# Patient Record
Sex: FEMALE | Race: WHITE | NOT HISPANIC OR LATINO | Employment: OTHER
[De-identification: names, ages, dates, MRNs, and addresses within clinical notes are randomized per-mention and may not be internally consistent; named-entity substitution may affect disease eponyms.]

---

## 2017-01-03 DIAGNOSIS — I10 HYPERTENSION GOAL BP (BLOOD PRESSURE) < 140/90: Primary | ICD-10-CM

## 2017-01-03 RX ORDER — ATENOLOL 25 MG/1
TABLET ORAL
Qty: 180 TABLET | Refills: 3 | Status: SHIPPED | OUTPATIENT
Start: 2017-01-03 | End: 2019-06-17

## 2017-01-03 NOTE — TELEPHONE ENCOUNTER
atenolol (TENORMIN) 25 MG tablet      Last Written Prescription Date: 11/4/15  Last Fill Quantity: 90, # refills: 3    Last Office Visit with [facility], [facility] or [facility] prescribing provider:  12/29/16   Future Office Visit:        BP Readings from Last 3 Encounters:   12/29/16 122/70   12/23/16 120/78   05/16/16 144/88

## 2017-01-03 NOTE — TELEPHONE ENCOUNTER
Prescription approved per [facility] Refill Protocol.    Ruthann Mcconnell RN

## 2017-05-26 ENCOUNTER — HEALTH MAINTENANCE LETTER (OUTPATIENT)
Age: 42
End: 2017-05-26

## 2017-09-15 ENCOUNTER — MYC MEDICAL ADVICE (OUTPATIENT)
Dept: FAMILY MEDICINE | Facility: CLINIC | Age: 42
End: 2017-09-15

## 2018-06-01 ENCOUNTER — HEALTH MAINTENANCE LETTER (OUTPATIENT)
Age: 43
End: 2018-06-01

## 2018-07-10 ENCOUNTER — TELEPHONE (OUTPATIENT)
Dept: FAMILY MEDICINE | Facility: CLINIC | Age: 43
End: 2018-07-10

## 2018-07-10 DIAGNOSIS — F41.1 GAD (GENERALIZED ANXIETY DISORDER): ICD-10-CM

## 2018-07-10 DIAGNOSIS — F33.1 MODERATE RECURRENT MAJOR DEPRESSION (H): ICD-10-CM

## 2018-07-10 NOTE — LETTER
[address] [address]  Phone: [phone]  Fax: [phone]        July 13, 2018      Ivy A Jose                                                                                                                                [id number]  [facility] [address]            Dear Ms. Garcia,    We are concerned about your health care.  We recently provided you with a medication refill.  Many medications require routine follow-up with your Doctor.      At this time we ask that: You schedule a routine office visit with your physician to follow your medication recheck.     Your prescription: Has been refilled for 1 month so you may have time for the above noted follow-up.      Thank you,      Dr. Elizabeth ROLDAN. Care Team

## 2018-07-10 NOTE — TELEPHONE ENCOUNTER
"Requested Prescriptions   Pending Prescriptions Disp Refills     escitalopram (LEXAPRO) 20 MG tablet [Pharmacy Med Name: ESCITALOPRAM OXALATE 20MG TABS] 90 tablet 3     Sig: TAKE ONE TABLET BY MOUTH ONCE DAILY    SSRIs Protocol Failed    7/10/2018  7:00 AM       Failed - PHQ-9 score less than 5 in past 6 months    Please review last PHQ-9 score.          Failed - Recent (6 mo) or future (30 days) visit within the authorizing provider's specialty    Patient had office visit in the last 6 months or has a visit in the next 30 days with authorizing provider or within the authorizing provider's specialty.  See \"Patient Info\" tab in inbasket, or \"Choose Columns\" in Meds & Orders section of the refill encounter.           Passed - Patient is age 18 or older       Passed - No active pregnancy on record       Passed - No positive pregnancy test in last 12 months        busPIRone (BUSPAR) 10 MG tablet [Pharmacy Med Name: BUSPIRONE HCL 10MG TABS] 120 tablet 11     Sig: TAKE TWO TABLETS BY MOUTH TWICE A DAY    Atypical Antidepressants Protocol Failed    7/10/2018  7:00 AM       Failed - Patient has PHQ-9 score less than 5 in past 6 months.    Please review last PHQ-9 score.          Failed - Recent (6 mo) or future (30 days) visit within the authorizing provider's specialty    Patient had office visit in the last 6 months or has a visit in the next 30 days with authorizing provider or within the authorizing provider's specialty.  See \"Patient Info\" tab in inbasket, or \"Choose Columns\" in Meds & Orders section of the refill encounter.           Passed - Patient is age 18 or older       Passed - No active pregnancy on record       Passed - No positive pregnancy test in past 12 mos          Last Written Prescription Date:  12/29/16  Last Fill Quantity: 9*0,  # refills: 3   Last Office Visit with [facility] or [facility] prescribing provider:  12/16/16   Future Office Visit:       Buspirone 10 MG       Last Written Prescription Date:  " 12/29/16  Last Fill Quantity: 120,   # refills: 11  Last Office Visit: 12/16/16  Future Office visit:

## 2018-07-11 NOTE — TELEPHONE ENCOUNTER
Lexapro:  PHQ-9 SCORE 10/7/2015 11/4/2015 12/29/2016   Total Score - - -   Total Score 3 0 13     Routing refill request to provider for review/approval because:  Labs out of range:  PHQ-9  Labs not current:  PHQ-9  T'd up 1 month for provider review.    Will forward to schedulers to schedule patient for OV.  Brittany Wilson RN  ;

## 2018-07-12 RX ORDER — BUSPIRONE HYDROCHLORIDE 10 MG/1
20 TABLET ORAL 2 TIMES DAILY
Qty: 120 TABLET | Refills: 0 | Status: SHIPPED | OUTPATIENT
Start: 2018-07-12 | End: 2019-06-17

## 2018-07-12 RX ORDER — ESCITALOPRAM OXALATE 20 MG/1
20 TABLET ORAL DAILY
Qty: 30 TABLET | Refills: 0 | Status: SHIPPED | OUTPATIENT
Start: 2018-07-12 | End: 2019-06-17

## 2018-07-12 NOTE — TELEPHONE ENCOUNTER
Patient needs follow-up office visit and med recheck.    Electronically signed by:  Drew Johnson M.D.  7/12/2018

## 2018-07-13 NOTE — TELEPHONE ENCOUNTER
Left message for patient to call back and speak with any .  Thank you,  Gris Stearns  Patient Representative    2nd attempt, routing to team to send letter

## 2018-07-13 NOTE — TELEPHONE ENCOUNTER
Left message for patient to call back in regards to message below. Please schedule for OV.   Tiffany Monet MA

## 2019-06-11 ENCOUNTER — TELEPHONE (OUTPATIENT)
Dept: FAMILY MEDICINE | Facility: CLINIC | Age: 44
End: 2019-06-11

## 2019-06-11 NOTE — TELEPHONE ENCOUNTER
See my chart message/triage RN    Message     Appointment For: Ivy Garcia ([id number])   Visit Type: MYCHART OFFICE VISIT LONG (119)      6/17/2019    2:45 PM  30 mins.  Héctor Madrigal MD [facility]      Patient Comments:   Swollen ankles and feet.

## 2019-06-11 NOTE — TELEPHONE ENCOUNTER
RN sent My Chart message about swelling and Home Care Instructions for it. If she is feeling SOB and weight up >5# with no change in dietary or activity habits, then should be seen sooner than later.....................VINNIE Lopez

## 2019-06-17 ENCOUNTER — OFFICE VISIT (OUTPATIENT)
Dept: FAMILY MEDICINE | Facility: CLINIC | Age: 44
End: 2019-06-17
Payer: COMMERCIAL

## 2019-06-17 ENCOUNTER — TELEPHONE (OUTPATIENT)
Dept: FAMILY MEDICINE | Facility: CLINIC | Age: 44
End: 2019-06-17

## 2019-06-17 VITALS
TEMPERATURE: 98.3 F | OXYGEN SATURATION: 100 % | HEIGHT: 63 IN | DIASTOLIC BLOOD PRESSURE: 80 MMHG | WEIGHT: 136 LBS | RESPIRATION RATE: 16 BRPM | BODY MASS INDEX: 24.1 KG/M2 | HEART RATE: 100 BPM | SYSTOLIC BLOOD PRESSURE: 148 MMHG

## 2019-06-17 DIAGNOSIS — Z98.84 S/P GASTRIC BYPASS: ICD-10-CM

## 2019-06-17 DIAGNOSIS — R60.0 BILATERAL LEG EDEMA: Primary | ICD-10-CM

## 2019-06-17 DIAGNOSIS — R01.1 UNDIAGNOSED CARDIAC MURMURS: ICD-10-CM

## 2019-06-17 DIAGNOSIS — F10.21 RECOVERING ALCOHOLIC IN REMISSION (H): ICD-10-CM

## 2019-06-17 DIAGNOSIS — I10 HYPERTENSION GOAL BP (BLOOD PRESSURE) < 140/90: ICD-10-CM

## 2019-06-17 LAB
ALBUMIN SERPL-MCNC: 3.9 G/DL (ref 3.4–5)
ALP SERPL-CCNC: 49 U/L (ref 40–150)
ALT SERPL W P-5'-P-CCNC: 18 U/L (ref 0–50)
ANION GAP SERPL CALCULATED.3IONS-SCNC: 8 MMOL/L (ref 3–14)
AST SERPL W P-5'-P-CCNC: 22 U/L (ref 0–45)
BILIRUB SERPL-MCNC: 0.3 MG/DL (ref 0.2–1.3)
BUN SERPL-MCNC: 11 MG/DL (ref 7–30)
CALCIUM SERPL-MCNC: 8.8 MG/DL (ref 8.5–10.1)
CHLORIDE SERPL-SCNC: 106 MMOL/L (ref 94–109)
CO2 SERPL-SCNC: 25 MMOL/L (ref 20–32)
CREAT SERPL-MCNC: 0.59 MG/DL (ref 0.52–1.04)
ERYTHROCYTE [DISTWIDTH] IN BLOOD BY AUTOMATED COUNT: 22.2 % (ref 10–15)
FERRITIN SERPL-MCNC: 2 NG/ML (ref 8–252)
FOLATE SERPL-MCNC: 15.7 NG/ML
GFR SERPL CREATININE-BSD FRML MDRD: >90 ML/MIN/{1.73_M2}
GLUCOSE SERPL-MCNC: 89 MG/DL (ref 70–99)
HCT VFR BLD AUTO: 20.8 % (ref 35–47)
HGB BLD-MCNC: 5.1 G/DL (ref 11.7–15.7)
MCH RBC QN AUTO: 15.9 PG (ref 26.5–33)
MCHC RBC AUTO-ENTMCNC: 24.5 G/DL (ref 31.5–36.5)
MCV RBC AUTO: 65 FL (ref 78–100)
PLATELET # BLD AUTO: 544 10E9/L (ref 150–450)
POTASSIUM SERPL-SCNC: 4 MMOL/L (ref 3.4–5.3)
PROT SERPL-MCNC: 7.9 G/DL (ref 6.8–8.8)
PTH-INTACT SERPL-MCNC: 56 PG/ML (ref 18–80)
RBC # BLD AUTO: 3.21 10E12/L (ref 3.8–5.2)
SODIUM SERPL-SCNC: 139 MMOL/L (ref 133–144)
TSH SERPL DL<=0.005 MIU/L-ACNC: 1.54 MU/L (ref 0.4–4)
VIT B12 SERPL-MCNC: 255 PG/ML (ref 193–986)
WBC # BLD AUTO: 6 10E9/L (ref 4–11)

## 2019-06-17 PROCEDURE — 36415 COLL VENOUS BLD VENIPUNCTURE: CPT | Performed by: FAMILY MEDICINE

## 2019-06-17 PROCEDURE — 82306 VITAMIN D 25 HYDROXY: CPT | Performed by: FAMILY MEDICINE

## 2019-06-17 PROCEDURE — 80053 COMPREHEN METABOLIC PANEL: CPT | Performed by: FAMILY MEDICINE

## 2019-06-17 PROCEDURE — 82607 VITAMIN B-12: CPT | Performed by: FAMILY MEDICINE

## 2019-06-17 PROCEDURE — 84443 ASSAY THYROID STIM HORMONE: CPT | Performed by: FAMILY MEDICINE

## 2019-06-17 PROCEDURE — 84425 ASSAY OF VITAMIN B-1: CPT | Mod: 90 | Performed by: FAMILY MEDICINE

## 2019-06-17 PROCEDURE — 82746 ASSAY OF FOLIC ACID SERUM: CPT | Performed by: FAMILY MEDICINE

## 2019-06-17 PROCEDURE — 83970 ASSAY OF PARATHORMONE: CPT | Performed by: FAMILY MEDICINE

## 2019-06-17 PROCEDURE — 99214 OFFICE O/P EST MOD 30 MIN: CPT | Performed by: FAMILY MEDICINE

## 2019-06-17 PROCEDURE — 93000 ELECTROCARDIOGRAM COMPLETE: CPT | Performed by: FAMILY MEDICINE

## 2019-06-17 PROCEDURE — 85027 COMPLETE CBC AUTOMATED: CPT | Performed by: FAMILY MEDICINE

## 2019-06-17 PROCEDURE — 99000 SPECIMEN HANDLING OFFICE-LAB: CPT | Performed by: FAMILY MEDICINE

## 2019-06-17 PROCEDURE — 82728 ASSAY OF FERRITIN: CPT | Performed by: FAMILY MEDICINE

## 2019-06-17 RX ORDER — LISINOPRIL 10 MG/1
10 TABLET ORAL DAILY
Qty: 30 TABLET | Refills: 0 | Status: SHIPPED | OUTPATIENT
Start: 2019-06-17 | End: 2019-07-01

## 2019-06-17 ASSESSMENT — MIFFLIN-ST. JEOR: SCORE: 1218.08

## 2019-06-17 ASSESSMENT — PATIENT HEALTH QUESTIONNAIRE - PHQ9: SUM OF ALL RESPONSES TO PHQ QUESTIONS 1-9: 7

## 2019-06-17 NOTE — TELEPHONE ENCOUNTER
Per  Dr. Madrigal;  called patient to let her know that her Hemoglobin was 5.1. Let her know that we will call her when we get the rest of her labs. She is to go to the ED if she starts to feel lightheaded, or dizzy. She voiced understanding.   Afsaneh Corrales CMA ([facility])

## 2019-06-17 NOTE — PROGRESS NOTES
"Subjective     Ivy Teague is a 44 year old female who presents to clinic today for the following health issues:    HPI   Musculoskeletal problem/pain      Duration: 3 Months    Description  Location: Bilateral ankle and feet    Intensity:  moderate    Accompanying signs and symptoms: swelling and if you push on it it's tender    History  Previous similar problem: no   Previous evaluation:  none    Precipitating or alleviating factors:  Trauma or overuse: no   Aggravating factors include: standing    Therapies tried and outcome: rest/inactivity, ice and elevation            Here for mild bilateral lower extremity edema for past 3 months.  Nothing severe, but mildly bothersome.  Legs are uncomfortable when they are swollen but not super painful.  She also notes some dyspnea on exertion and can only walk up about 2 flights of stairs without having to stop and catch her breath.  She is able to do some work as a  at her apartment building without too much exercise issue.    Has history of hypertension.  Has been off of medications for over a year due to lack of insurance.  Has it now so is back getting healthcare.  Was on atenolol.  This was due to pregnancy induced hypertension and ease in management while she was still having kids.  Has not been on lisinopril before.    Has past history of alcohol dependency.  Has been in remission for 1.5 years.    History of gastric bypass.  Has had nutritional deficiencies in the past.    Reviewed and updated as needed this visit by Provider  Tobacco  Allergies  Meds  Problems  Med Hx  Surg Hx  Fam Hx         Review of Systems   ROS COMP: Constitutional, HEENT, cardiovascular, pulmonary, GI, , musculoskeletal, neuro, skin, endocrine and psych systems are negative, except as otherwise noted.      Objective    /80   Pulse 100   Temp 98.3  F (36.8  C) (Temporal)   Resp 16   Ht 1.588 m (5' 2.5\")   Wt 61.7 kg (136 lb)   SpO2 100%   BMI 24.48 kg/m  "   Body mass index is 24.48 kg/m .  Physical Exam   Constitutional: She appears well-developed and well-nourished. No distress.   Cardiovascular: Normal rate and regular rhythm. Exam reveals no friction rub.   Murmur heard.   Systolic murmur is present with a grade of 3/6.  Pulmonary/Chest: Effort normal and breath sounds normal. No stridor. She has no decreased breath sounds. She has no wheezes. She has no rhonchi. She has no rales.   Abdominal: Soft. Normal appearance and bowel sounds are normal. She exhibits no distension and no mass. There is no hepatosplenomegaly. There is no tenderness. No hernia.   Musculoskeletal: She exhibits edema (1+ bilateral lower extremity).   Neurological: She is alert.   Psychiatric: She has a normal mood and affect. Her speech is normal and behavior is normal. Judgment and thought content normal. Cognition and memory are normal.                  EKG shows normal sinus rhythm without ST elevation or T-wave abnormality.      Assessment & Plan     ASSESSMENT/ORDERS:    ICD-10-CM    1. Bilateral leg edema R60.0 Comprehensive metabolic panel     Basic metabolic panel     Echocardiogram Complete   2. Undiagnosed cardiac murmurs R01.1 Echocardiogram Complete   3. Hypertension goal BP (blood pressure) < 140/90 I10 Comprehensive metabolic panel     Basic metabolic panel     Echocardiogram Complete     lisinopril (PRINIVIL/ZESTRIL) 10 MG tablet   4. Recovering alcoholic in remission (H) F10.21    5. S/P gastric bypass Z98.84 CBC with platelets     Ferritin     Vitamin B12     Vitamin D Deficiency     Folate     Parathyroid Hormone Intact     Vitamin B1 whole blood     Lipid panel reflex to direct LDL Fasting     Comprehensive metabolic panel     Basic metabolic panel     EKG 12-lead complete w/read - Clinics     TSH with free T4 reflex     PLAN:  1.   Lab evaluation as noted above.  2.  Echocardiogram  3.  Needs to restart blood pressure medication.  Will start on lisinopril today.  Needs  follow-up BMP and fasting lipids.  4.  Follow-up in 2 weeks to recheck her blood pressure, labs and to discuss echocardiogram results.     Tobacco Cessation:   reports that she has been smoking cigarettes.  She has been smoking about 1.00 pack per day. She has never used smokeless tobacco.  Tobacco Cessation Action Plan: Information offered: Patient not interested at this time            Return in about 2 weeks (around 7/1/2019) for follow up on lab and echocardiogram results.    Héctor Madrigal MD  [facility]

## 2019-06-18 LAB — DEPRECATED CALCIDIOL+CALCIFEROL SERPL-MC: 22 UG/L (ref 20–75)

## 2019-06-19 ENCOUNTER — HOSPITAL ENCOUNTER (OUTPATIENT)
Dept: CARDIOLOGY | Facility: CLINIC | Age: 44
Discharge: HOME | End: 2019-06-19
Attending: FAMILY MEDICINE | Admitting: FAMILY MEDICINE
Payer: COMMERCIAL

## 2019-06-19 DIAGNOSIS — I10 HYPERTENSION GOAL BP (BLOOD PRESSURE) < 140/90: ICD-10-CM

## 2019-06-19 DIAGNOSIS — R60.0 BILATERAL LEG EDEMA: ICD-10-CM

## 2019-06-19 DIAGNOSIS — R01.1 UNDIAGNOSED CARDIAC MURMURS: ICD-10-CM

## 2019-06-19 PROCEDURE — 93306 TTE W/DOPPLER COMPLETE: CPT | Mod: 26 | Performed by: INTERNAL MEDICINE

## 2019-06-19 PROCEDURE — 93306 TTE W/DOPPLER COMPLETE: CPT

## 2019-06-21 ENCOUNTER — TELEPHONE (OUTPATIENT)
Dept: FAMILY MEDICINE | Facility: CLINIC | Age: 44
End: 2019-06-21

## 2019-06-21 DIAGNOSIS — R60.0 BILATERAL LEG EDEMA: Primary | ICD-10-CM

## 2019-06-21 DIAGNOSIS — I10 HYPERTENSION GOAL BP (BLOOD PRESSURE) < 140/90: ICD-10-CM

## 2019-06-21 DIAGNOSIS — R01.1 UNDIAGNOSED CARDIAC MURMURS: ICD-10-CM

## 2019-06-21 LAB — VIT B1 BLD-MCNC: 118 NMOL/L (ref 70–180)

## 2019-06-21 NOTE — RESULT ENCOUNTER NOTE
Results called to patient.  I recommended she cardiology to discuss her echocardiogram findings in the setting of her lower extremity edema and hypertension.  No sure if this is at all related and/or if they would recommend anything specific for this.  Sending message to staff to place referral and contact patient to set up appointment.    Sincerely,  Dr. Madrigal

## 2019-06-21 NOTE — RESULT ENCOUNTER NOTE
Results called to patient and she was recommended to start three times daily over the counter iron supplement.  Recheck hemoglobin/iron in 2 months.  She is asymptomatic despite this low hemoglobin.    Héctor Madrigal MD

## 2019-06-24 NOTE — TELEPHONE ENCOUNTER
----- Message from Héctor Madrigal MD sent at 6/21/2019  4:04 PM CDT -----  Results called to patient.  I recommended she cardiology to discuss her echocardiogram findings in the setting of her lower extremity edema and hypertension.  No sure if this is at all related and/or if they would recommend anything specific for this.  Sending message to staff to place referral and contact patient to set up appointment.    Sincerely,  Dr. Madrigal  
Message left of recommendations per Dr. Madrigal that you follow up with Cardiology in regards to your echocardiogram results, ongoing lower extrem edema and your hypertension. Inform she is scheduled with Cardiology on Tuesday, 7/23 at 11:30 am. Number left to call if this appointment does not work for her to reschedule. Shahnaz Clay LPN    
Order placed for Cardiology consult for follow up on Echocardiogram results, her hypertension, and her bilateral lower extremity edema. Shahnaz Clay LPN    
22

## 2019-07-01 ENCOUNTER — OFFICE VISIT (OUTPATIENT)
Dept: FAMILY MEDICINE | Facility: CLINIC | Age: 44
End: 2019-07-01
Payer: COMMERCIAL

## 2019-07-01 VITALS
TEMPERATURE: 98.6 F | RESPIRATION RATE: 18 BRPM | OXYGEN SATURATION: 100 % | BODY MASS INDEX: 23.74 KG/M2 | WEIGHT: 134 LBS | SYSTOLIC BLOOD PRESSURE: 122 MMHG | HEART RATE: 122 BPM | DIASTOLIC BLOOD PRESSURE: 76 MMHG | HEIGHT: 63 IN

## 2019-07-01 DIAGNOSIS — R73.01 ELEVATED FASTING BLOOD SUGAR: ICD-10-CM

## 2019-07-01 DIAGNOSIS — I10 HYPERTENSION GOAL BP (BLOOD PRESSURE) < 140/90: Primary | ICD-10-CM

## 2019-07-01 DIAGNOSIS — R60.0 BILATERAL LEG EDEMA: ICD-10-CM

## 2019-07-01 DIAGNOSIS — I10 HYPERTENSION GOAL BP (BLOOD PRESSURE) < 140/90: ICD-10-CM

## 2019-07-01 DIAGNOSIS — Z98.84 S/P GASTRIC BYPASS: ICD-10-CM

## 2019-07-01 DIAGNOSIS — R93.1 ABNORMAL ECHOCARDIOGRAM: ICD-10-CM

## 2019-07-01 LAB
ANION GAP SERPL CALCULATED.3IONS-SCNC: 9 MMOL/L (ref 3–14)
BUN SERPL-MCNC: 10 MG/DL (ref 7–30)
CALCIUM SERPL-MCNC: 8.8 MG/DL (ref 8.5–10.1)
CHLORIDE SERPL-SCNC: 107 MMOL/L (ref 94–109)
CHOLEST SERPL-MCNC: 166 MG/DL
CO2 SERPL-SCNC: 23 MMOL/L (ref 20–32)
CREAT SERPL-MCNC: 0.54 MG/DL (ref 0.52–1.04)
GFR SERPL CREATININE-BSD FRML MDRD: >90 ML/MIN/{1.73_M2}
GLUCOSE SERPL-MCNC: 113 MG/DL (ref 70–99)
HDLC SERPL-MCNC: 66 MG/DL
LDLC SERPL CALC-MCNC: 83 MG/DL
NONHDLC SERPL-MCNC: 100 MG/DL
POTASSIUM SERPL-SCNC: 3.7 MMOL/L (ref 3.4–5.3)
SODIUM SERPL-SCNC: 139 MMOL/L (ref 133–144)
TRIGL SERPL-MCNC: 84 MG/DL

## 2019-07-01 PROCEDURE — 99214 OFFICE O/P EST MOD 30 MIN: CPT | Performed by: FAMILY MEDICINE

## 2019-07-01 PROCEDURE — 80048 BASIC METABOLIC PNL TOTAL CA: CPT | Performed by: FAMILY MEDICINE

## 2019-07-01 PROCEDURE — 36415 COLL VENOUS BLD VENIPUNCTURE: CPT | Performed by: FAMILY MEDICINE

## 2019-07-01 PROCEDURE — 80061 LIPID PANEL: CPT | Performed by: FAMILY MEDICINE

## 2019-07-01 RX ORDER — LISINOPRIL 10 MG/1
10 TABLET ORAL DAILY
Qty: 90 TABLET | Refills: 0 | Status: SHIPPED | OUTPATIENT
Start: 2019-07-01 | End: 2019-07-23

## 2019-07-01 ASSESSMENT — MIFFLIN-ST. JEOR: SCORE: 1209.01

## 2019-07-01 ASSESSMENT — PAIN SCALES - GENERAL: PAINLEVEL: NO PAIN (0)

## 2019-07-01 NOTE — PROGRESS NOTES
"Subjective     Ivy Teague is a 44 year old female who presents to clinic today for the following health issues:    HPI   Pt here to f/u on labs.         Edema better since starting lisinopril.  Hypertension has improved.  Labs reviewed.  Elevated glucose, otherwise normal labs.    Echocardiogram results reviewed.  Had murmur at last visit.  Dilated left and right atriums.  Normal EF.  Has history of alcohol use.  She is currently sober.  I recommended she see cardiologist when we called her with her results due to the echo findings and the bilateral lower extremity edema.    She feels better.  Reviewed and updated as needed this visit by Provider  Tobacco  Allergies  Meds  Problems  Med Hx  Surg Hx  Fam Hx         Review of Systems   ROS COMP: Constitutional, HEENT, cardiovascular, pulmonary, GI, , musculoskeletal, neuro, skin, endocrine and psych systems are negative, except as otherwise noted.      Objective    /76   Pulse 122   Temp 98.6  F (37  C) (Temporal)   Resp 18   Ht 1.588 m (5' 2.5\")   Wt 60.8 kg (134 lb)   SpO2 100%   Breastfeeding? No   BMI 24.12 kg/m    Body mass index is 24.12 kg/m .  Physical Exam   Constitutional: She appears well-developed and well-nourished. No distress.   Cardiovascular: Normal rate and regular rhythm. Exam reveals no friction rub.   No murmur heard.  Pulmonary/Chest: Effort normal and breath sounds normal. No stridor. She has no decreased breath sounds. She has no wheezes. She has no rhonchi. She has no rales.   Abdominal: Soft. Normal appearance and bowel sounds are normal. She exhibits no distension and no mass. There is no hepatosplenomegaly. There is no tenderness. No hernia.   Musculoskeletal: She exhibits edema (trace bilateral lower extremity.  no sock or pant indent lines.).   Neurological: She is alert.   Psychiatric: She has a normal mood and affect. Her speech is normal and behavior is normal. Judgment and thought content normal. " Cognition and memory are normal.                  Assessment & Plan     ASSESSMENT/ORDERS:    ICD-10-CM    1. Hypertension goal BP (blood pressure) < 140/90 I10 lisinopril (PRINIVIL/ZESTRIL) 10 MG tablet   2. Elevated fasting blood sugar R73.01    3. Abnormal echocardiogram R93.1    4. Bilateral leg edema R60.0      PLAN:  1.  Murmur resolved.  Edema better.  I recommended patient still see cardiology to review echocardiogram results and offer any additional monitoring recommendations.  2.  Continue lisinopril.  3.  Monitor fasting blood sugar.  Discussed long term risk for diabetes.      Tobacco Cessation:   reports that she has been smoking cigarettes.  She has been smoking about 1.00 pack per day. She has never used smokeless tobacco.  Tobacco Cessation Action Plan: Information offered: Patient not interested at this time            Return in about 3 months (around 10/1/2019) for next preventative visit (Physical).    Héctor Madrigal MD  [facility]

## 2019-07-03 NOTE — RESULT ENCOUNTER NOTE
Ivy,  Your results show your blood sugar is a little elevated.  The rest of your results are within normal limits.  Please let me know if you have any questions.    Sincerely,  Dr. Madrigal

## 2019-07-23 ENCOUNTER — TELEPHONE (OUTPATIENT)
Dept: FAMILY MEDICINE | Facility: CLINIC | Age: 44
End: 2019-07-23

## 2019-07-23 ENCOUNTER — OFFICE VISIT (OUTPATIENT)
Dept: CARDIOLOGY | Facility: CLINIC | Age: 44
End: 2019-07-23
Payer: COMMERCIAL

## 2019-07-23 VITALS
WEIGHT: 131.9 LBS | HEART RATE: 90 BPM | OXYGEN SATURATION: 97 % | BODY MASS INDEX: 23.37 KG/M2 | DIASTOLIC BLOOD PRESSURE: 86 MMHG | SYSTOLIC BLOOD PRESSURE: 146 MMHG | HEIGHT: 63 IN

## 2019-07-23 DIAGNOSIS — I10 HYPERTENSION GOAL BP (BLOOD PRESSURE) < 140/90: ICD-10-CM

## 2019-07-23 DIAGNOSIS — D50.9 IRON DEFICIENCY ANEMIA: Primary | ICD-10-CM

## 2019-07-23 DIAGNOSIS — D50.9 IRON DEFICIENCY ANEMIA, UNSPECIFIED IRON DEFICIENCY ANEMIA TYPE: Primary | ICD-10-CM

## 2019-07-23 DIAGNOSIS — D50.9 ANEMIA, IRON DEFICIENCY: Primary | ICD-10-CM

## 2019-07-23 LAB
ABO + RH BLD: NORMAL
ABO + RH BLD: NORMAL
BLD GP AB SCN SERPL QL: NORMAL
BLD PROD TYP BPU: NORMAL
BLOOD BANK CMNT PATIENT-IMP: NORMAL
FERRITIN SERPL-MCNC: 2 NG/ML (ref 8–252)
HGB BLD-MCNC: 4.8 G/DL (ref 11.7–15.7)
NUM BPU REQUESTED: 4
SPECIMEN EXP DATE BLD: NORMAL

## 2019-07-23 PROCEDURE — 99204 OFFICE O/P NEW MOD 45 MIN: CPT | Performed by: INTERNAL MEDICINE

## 2019-07-23 PROCEDURE — 36415 COLL VENOUS BLD VENIPUNCTURE: CPT | Performed by: INTERNAL MEDICINE

## 2019-07-23 PROCEDURE — 85018 HEMOGLOBIN: CPT | Performed by: INTERNAL MEDICINE

## 2019-07-23 PROCEDURE — 82728 ASSAY OF FERRITIN: CPT | Performed by: INTERNAL MEDICINE

## 2019-07-23 RX ORDER — LISINOPRIL 10 MG/1
20 TABLET ORAL DAILY
Qty: 90 TABLET | Refills: 0 | Status: SHIPPED | OUTPATIENT
Start: 2019-07-23 | End: 2019-09-27

## 2019-07-23 RX ORDER — HEPARIN SODIUM,PORCINE 10 UNIT/ML
5 VIAL (ML) INTRAVENOUS
Status: CANCELLED | OUTPATIENT
Start: 2019-07-23

## 2019-07-23 RX ORDER — HEPARIN SODIUM (PORCINE) LOCK FLUSH IV SOLN 100 UNIT/ML 100 UNIT/ML
5 SOLUTION INTRAVENOUS
Status: CANCELLED | OUTPATIENT
Start: 2019-07-23

## 2019-07-23 ASSESSMENT — MIFFLIN-ST. JEOR: SCORE: 1199.48

## 2019-07-23 NOTE — LETTER
7/23/2019    Drew Johnson MD, MD  [address] [place] [address]    RE: Ivy Teague       Dear Colleague,    I had the pleasure of seeing Ivy Teague in the [facility].    CARDIOLOGY CONSULT    REASON FOR CONSULT:  Shortness of breath, lower extremity edema      PRIMARY CARE PHYSICIAN:  Drew Johnson MD    HISTORY OF PRESENT ILLNESS: Ms. Gonzalez is a pleasant 44-year-old with past medical history significant for hypertension, gastric bypass surgery in 2002 and iron deficiency anemia who presents for the evaluation of shortness of breath and lower extremity edema.    She was recently seen by her primary care physician in June.  At that time she reported a 3-month history of mild lower extremity edema.  She works at a factory and notes that her legs felt heavy and somewhat tight by the end of the day.  Additionally she noted worsening shortness of breath doing usual activities.  She denies any exertional chest pain, chest discomfort.  She denies any heart palpitations or heart racing.  She was noted to be hypertensive and tachycardic.  A heart murmur was documented.  She does have a history of hypertension and had not been on any blood pressure medication due to lack of insurance.  She was started on lisinopril 10 mg daily.  An echocardiogram was performed which demonstrates normal LV function and no significant valve abnormalities.  Her atria were moderately dilated.  Of note, her hemoglobin was 5.1.  She does have a history of iron deficiency anemia, however prior hemoglobins are noted to be in the 9-10 range.    Today, Viviane states she has been taking iron supplements 3 times daily for the past month.  She feels no better.  She feels extremely fatigued, short of breath.  She continues to note some mild lower extremity edema.  She denies any heart palpitations or racing heart.  She denies any chest pain chest discomfort comfort.  She denies any  lightheadedness or dizziness.  She notes that her menses are now more irregular and did have a heavier period last month.  She has some GI issues related to her gastric bypass surgery with some diarrhea.  She feels that her stools may be slightly darker in color but nothing particularly unusual.      PAST MEDICAL HISTORY:  1.  Hx of ETOH abuse;  Abstinent 2 years  2.  Alcohol induced pancreatitis  3.  Iron deficiency anemia  4.  Depression  5.  Hypertension  6.  GERD  7.  Gastric bypass surgery 2002    MEDICATIONS:  Current Outpatient Medications   Medication     acetaminophen (TYLENOL) 500 MG tablet     lisinopril (PRINIVIL/ZESTRIL) 10 MG tablet     No current facility-administered medications for this visit.        ALLERGIES:  Allergies   Allergen Reactions     No Known Drug Allergies        SOCIAL HISTORY:  I have reviewed this patient's social history and updated it with pertinent information if needed. Ivy Teague  reports that she has been smoking cigarettes.  She has been smoking about 1.00 pack per day. She has never used smokeless tobacco. She reports that she drinks alcohol. She reports that she does not use drugs.    FAMILY HISTORY:  I have reviewed this patient's family history and updated it with pertinent information if needed.   Family History   Problem Relation Age of Onset     Hypertension Mother         borderline, not on meds right now     Hypertension Father      Heart Disease Father         s/p MI and bypass surg at age 43, first MI at 38.     Gastrointestinal Disease Father         had a partial small bowel resection secondary to ischemic bowel     Neurologic Disorder Paternal Aunt         Cerebral palsey     Neurologic Disorder Maternal Uncle         epilepsy     Neurologic Disorder Other         Pat. cousin with epilepsy     Diabetes Paternal Grandmother         IDDM       REVIEW OF SYSTEMS:  A complete ROS was obtained and the pertinent positives are outlined in the history of  present illness above.  The remainder of systems is negative.      PHYSICAL EXAM:      BP: 146/86 Pulse: 90     SpO2: 97 %      Vital Signs with Ranges  Pulse:  [90] 90  BP: (146)/(86) 146/86  SpO2:  [97 %] 97 %  131 lbs 14.4 oz    Constitutional: awake, alert, no distress  Eyes: PERRL, sclera nonicteric  ENT: trachea midline  Respiratory: CTAB  Cardiovascular:  Tachycardic, I/VI systolic flow murmur, No JVD  GI: nondistended, nontender, bowel sounds present  Lymph/Hematologic: no lymphadenopathy  Skin: dry, no rash  Musculoskeletal: good muscle tone, trace edema bilaterally  Neurologic: no focal deficits  Neuropsychiatric: appropriate affact      DATA:  Reviewed in Epic  EKG Dated 6/17/2019 reviewed personally:  Normal sinus rhythm without ST segment changes    Echocardiogram:  The visual ejection fraction is estimated at 60-65%.  The right ventricle is normal in size and function.  The left atrium is moderate to severely dilated.  The right atrium is mild to moderately dilated.          ASSESSMENT:  1.  Symptomatic iron deficiency anemia:  With fatigue, shortness of breath, tachycardia.  Started on oral iron last month.  2.  Hypertension: Long standing hx-started with pregnancy. On lisinopril, remains mildly elevated.  3.  Mild lower extremity edema:  In the setting of profound anemia and probable high out put state.  4.  S/P gastric bypass surgery    RECOMMENDATIONS:  1. We reviewed my concerns regarding her profound iron deficiency anemia as I suspect this is the major issue at play.  She is quite symptomatic and does not feel any differently since starting iron.  Given the degree of anemia and the increased workload on the heart, she would benefit from a blood transfusion and possibly IV iron.  I am not clear on what work up she has had done in the past for her iron deficiency anemia (possibly related to poor iron absorption related to previous gastric bypass), but may benefit from a hematology consult as well.   Repeat Hgb and ferritin today to see if any affect from oral iron supplementation this past month-further recommendations to follow after review of lab work today.    2. Increase lisinopril to 20 mg daily.  This may be further titrated as necessary.  BMP in one week.    3. If lower extremity edema persists after Hgb addressed, could consider lower extremity venous ultrasound to exclude chronic venous insufficiency.      Diandra Kruger MD  Cardiology - [facility]  Pager:  [phone]  July 23, 2019    Thank you for allowing me to participate in the care of your patient.    Sincerely,     Diandra Kruger MD     [facility]    cc:   Héctor Madrigal MD  [address] [place] [place] [address]

## 2019-07-23 NOTE — PROGRESS NOTES
CARDIOLOGY CONSULT    REASON FOR CONSULT:  Shortness of breath, lower extremity edema      PRIMARY CARE PHYSICIAN:  Drew Johnson MD    HISTORY OF PRESENT ILLNESS: Ms. Gonzalez is a pleasant 44-year-old with past medical history significant for hypertension, gastric bypass surgery in 2002 and iron deficiency anemia who presents for the evaluation of shortness of breath and lower extremity edema.    She was recently seen by her primary care physician in June.  At that time she reported a 3-month history of mild lower extremity edema.  She works at a factory and notes that her legs felt heavy and somewhat tight by the end of the day.  Additionally she noted worsening shortness of breath doing usual activities.  She denies any exertional chest pain, chest discomfort.  She denies any heart palpitations or heart racing.  She was noted to be hypertensive and tachycardic.  A heart murmur was documented.  She does have a history of hypertension and had not been on any blood pressure medication due to lack of insurance.  She was started on lisinopril 10 mg daily.  An echocardiogram was performed which demonstrates normal LV function and no significant valve abnormalities.  Her atria were moderately dilated.  Of note, her hemoglobin was 5.1.  She does have a history of iron deficiency anemia, however prior hemoglobins are noted to be in the 9-10 range.    Today, Viviane states she has been taking iron supplements 3 times daily for the past month.  She feels no better.  She feels extremely fatigued, short of breath.  She continues to note some mild lower extremity edema.  She denies any heart palpitations or racing heart.  She denies any chest pain chest discomfort comfort.  She denies any lightheadedness or dizziness.  She notes that her menses are now more irregular and did have a heavier period last month.  She has some GI issues related to her gastric bypass surgery with some diarrhea.  She feels that her stools may be  slightly darker in color but nothing particularly unusual.      PAST MEDICAL HISTORY:  1.  Hx of ETOH abuse;  Abstinent 2 years  2.  Alcohol induced pancreatitis  3.  Iron deficiency anemia  4.  Depression  5.  Hypertension  6.  GERD  7.  Gastric bypass surgery 2002    MEDICATIONS:  Current Outpatient Medications   Medication     acetaminophen (TYLENOL) 500 MG tablet     lisinopril (PRINIVIL/ZESTRIL) 10 MG tablet     No current facility-administered medications for this visit.        ALLERGIES:  Allergies   Allergen Reactions     No Known Drug Allergies        SOCIAL HISTORY:  I have reviewed this patient's social history and updated it with pertinent information if needed. Ivy Teague  reports that she has been smoking cigarettes.  She has been smoking about 1.00 pack per day. She has never used smokeless tobacco. She reports that she drinks alcohol. She reports that she does not use drugs.    FAMILY HISTORY:  I have reviewed this patient's family history and updated it with pertinent information if needed.   Family History   Problem Relation Age of Onset     Hypertension Mother         borderline, not on meds right now     Hypertension Father      Heart Disease Father         s/p MI and bypass surg at age 43, first MI at 38.     Gastrointestinal Disease Father         had a partial small bowel resection secondary to ischemic bowel     Neurologic Disorder Paternal Aunt         Cerebral palsey     Neurologic Disorder Maternal Uncle         epilepsy     Neurologic Disorder Other         Pat. cousin with epilepsy     Diabetes Paternal Grandmother         IDDM       REVIEW OF SYSTEMS:  A complete ROS was obtained and the pertinent positives are outlined in the history of present illness above.  The remainder of systems is negative.      PHYSICAL EXAM:      BP: 146/86 Pulse: 90     SpO2: 97 %      Vital Signs with Ranges  Pulse:  [90] 90  BP: (146)/(86) 146/86  SpO2:  [97 %] 97 %  131 lbs 14.4  oz    Constitutional: awake, alert, no distress  Eyes: PERRL, sclera nonicteric  ENT: trachea midline  Respiratory: CTAB  Cardiovascular:  Tachycardic, I/VI systolic flow murmur, No JVD  GI: nondistended, nontender, bowel sounds present  Lymph/Hematologic: no lymphadenopathy  Skin: dry, no rash  Musculoskeletal: good muscle tone, trace edema bilaterally  Neurologic: no focal deficits  Neuropsychiatric: appropriate affact      DATA:  Reviewed in Epic  EKG Dated 6/17/2019 reviewed personally:  Normal sinus rhythm without ST segment changes    Echocardiogram:  The visual ejection fraction is estimated at 60-65%.  The right ventricle is normal in size and function.  The left atrium is moderate to severely dilated.  The right atrium is mild to moderately dilated.          ASSESSMENT:  1.  Symptomatic iron deficiency anemia:  With fatigue, shortness of breath, tachycardia.  Started on oral iron last month.  2.  Hypertension: Long standing hx-started with pregnancy. On lisinopril, remains mildly elevated.  3.  Mild lower extremity edema:  In the setting of profound anemia and probable high out put state.  4.  S/P gastric bypass surgery    RECOMMENDATIONS:  1. We reviewed my concerns regarding her profound iron deficiency anemia as I suspect this is the major issue at play.  She is quite symptomatic and does not feel any differently since starting iron.  Given the degree of anemia and the increased workload on the heart, she would benefit from a blood transfusion and possibly IV iron.  I am not clear on what work up she has had done in the past for her iron deficiency anemia (possibly related to poor iron absorption related to previous gastric bypass), but may benefit from a hematology consult as well.  Repeat Hgb and ferritin today to see if any affect from oral iron supplementation this past month-further recommendations to follow after review of lab work today.    2. Increase lisinopril to 20 mg daily.  This may be  further titrated as necessary.  BMP in one week.    3. If lower extremity edema persists after Hgb addressed, could consider lower extremity venous ultrasound to exclude chronic venous insufficiency.      Diandra Kruger MD  Cardiology - [facility]  Pager:  [phone]  July 23, 2019

## 2019-07-23 NOTE — TELEPHONE ENCOUNTER
Patient called to discuss consent for blood transfusion.  She had her period since last seeing me and it was quite heavy.  Is doing three times daily iron supplement as recommended since her last hemoglobin was drawn.      She will be in for transfusion tomorrow at 8:30.  She will follow-up with me in 2 weeks for follow-up with labs prior to appointment.    Héctor Madrigal MD

## 2019-07-23 NOTE — TELEPHONE ENCOUNTER
Called and LM for pt that she needs to report to Infusion tomorrow am at 7-24 at 815 check in up front and she is setup for a transfusion at 830, she was told on the message she will be here all day.  No more labs needed today. They have enough for crossmatch and type per Ariana in infusion.  DEEPTI will call pt and get consent. Put in orders and pt will be ready to go in the am. MARSHA

## 2019-07-23 NOTE — PATIENT INSTRUCTIONS
-Lab work today.  Will call you with the results.  -Increase lisinopril to 20 mg daily.  Recheck BMP in one week.  -Follow up with cardiology as needed.

## 2019-07-23 NOTE — TELEPHONE ENCOUNTER
I was called by cardiology today and her hemoglobin is lower at 4.8.  Was recommended to have packed RBC transfusion.    Will have staff notify patient and infusion department.  Have this done in 1-2 days.  4 units and then have her follow-up in clinic with me in 2 weeks for recheck hemoglobin and ferritin and then see if she needs IV iron infusion for her iron deficiency anemia.    Patient needs informed consent.  Will have staff contact infusion department to see how to obtain this.    Héctor Madrigal MD

## 2019-07-24 ENCOUNTER — INFUSION THERAPY VISIT (OUTPATIENT)
Dept: INFUSION THERAPY | Facility: CLINIC | Age: 44
End: 2019-07-24
Attending: FAMILY MEDICINE
Payer: COMMERCIAL

## 2019-07-24 VITALS
WEIGHT: 131.84 LBS | TEMPERATURE: 98.7 F | DIASTOLIC BLOOD PRESSURE: 84 MMHG | HEART RATE: 64 BPM | HEIGHT: 63 IN | OXYGEN SATURATION: 98 % | BODY MASS INDEX: 23.36 KG/M2 | SYSTOLIC BLOOD PRESSURE: 136 MMHG | RESPIRATION RATE: 18 BRPM

## 2019-07-24 DIAGNOSIS — D50.9 IRON DEFICIENCY ANEMIA, UNSPECIFIED IRON DEFICIENCY ANEMIA TYPE: Primary | ICD-10-CM

## 2019-07-24 DIAGNOSIS — D50.9 IRON DEFICIENCY ANEMIA: ICD-10-CM

## 2019-07-24 LAB
BLD PROD TYP BPU: NORMAL
BLD UNIT ID BPU: 0
BLOOD PRODUCT CODE: NORMAL
BPU ID: NORMAL
TRANSFUSION STATUS PATIENT QL: NORMAL

## 2019-07-24 PROCEDURE — P9016 RBC LEUKOCYTES REDUCED: HCPCS | Performed by: FAMILY MEDICINE

## 2019-07-24 PROCEDURE — 86900 BLOOD TYPING SEROLOGIC ABO: CPT | Performed by: FAMILY MEDICINE

## 2019-07-24 PROCEDURE — 25000128 H RX IP 250 OP 636: Performed by: FAMILY MEDICINE

## 2019-07-24 PROCEDURE — 86850 RBC ANTIBODY SCREEN: CPT | Performed by: FAMILY MEDICINE

## 2019-07-24 PROCEDURE — 86923 COMPATIBILITY TEST ELECTRIC: CPT | Performed by: FAMILY MEDICINE

## 2019-07-24 PROCEDURE — 86901 BLOOD TYPING SEROLOGIC RH(D): CPT | Performed by: FAMILY MEDICINE

## 2019-07-24 PROCEDURE — 36430 TRANSFUSION BLD/BLD COMPNT: CPT

## 2019-07-24 RX ORDER — HEPARIN SODIUM (PORCINE) LOCK FLUSH IV SOLN 100 UNIT/ML 100 UNIT/ML
5 SOLUTION INTRAVENOUS
Status: CANCELLED | OUTPATIENT
Start: 2019-07-24

## 2019-07-24 RX ORDER — HEPARIN SODIUM,PORCINE 10 UNIT/ML
5 VIAL (ML) INTRAVENOUS
Status: CANCELLED | OUTPATIENT
Start: 2019-07-24

## 2019-07-24 RX ADMIN — SODIUM CHLORIDE 250 ML: 9 INJECTION, SOLUTION INTRAVENOUS at 08:55

## 2019-07-24 ASSESSMENT — MIFFLIN-ST. JEOR: SCORE: 1199.19

## 2019-07-24 ASSESSMENT — PAIN SCALES - GENERAL: PAINLEVEL: NO PAIN (0)

## 2019-07-24 NOTE — PATIENT INSTRUCTIONS
Pt to return on 08/07/19 for Labs for Dr. Branch appt 8/8. Copies of medication list and upcoming appointments given prior to discharge.     Patient here today for 4 Units Packed Red Blood cell transfusions.

## 2019-07-24 NOTE — PROGRESS NOTES
Infusion Nursing Note:  Ivy Teague presents today for 4 Units PRBC's..    Patient seen by provider today: No   present during visit today: Not Applicable.    Note: Ambulated to IVO.    Intravenous Access:  Peripheral IV placed.    Treatment Conditions:  Lab Results   Component Value Date    HGB 4.8 07/23/2019     Lab Results   Component Value Date    WBC 6.0 06/17/2019      Lab Results   Component Value Date    ANEU 3.7 12/23/2016     Lab Results   Component Value Date     06/17/2019      Blood transfusion consent signed 7/24/19.      Post Infusion Assessment:  Patient tolerated infusion without incident.  Blood return noted pre and post infusion.  Site patent and intact, free from redness, edema or discomfort.  No evidence of extravasations.  Access discontinued per protocol.  Lung sounds clear post transfusions.       Discharge Plan:   Discharge instructions reviewed with: Patient and friend.  Patient and/or family verbalized understanding of discharge instructions and all questions answered.  Copy of AVS reviewed with patient and/or family.  Patient will return 8/7/19 for next appointment.  Patient discharged in stable condition accompanied by: self and friend.  Departure Mode: Ambulatory.    Zaida Garcia RN

## 2019-07-25 ENCOUNTER — MYC MEDICAL ADVICE (OUTPATIENT)
Dept: FAMILY MEDICINE | Facility: CLINIC | Age: 44
End: 2019-07-25

## 2019-07-26 ENCOUNTER — NURSE TRIAGE (OUTPATIENT)
Dept: FAMILY MEDICINE | Facility: CLINIC | Age: 44
End: 2019-07-26

## 2019-07-26 NOTE — TELEPHONE ENCOUNTER
I left a message asking patient to return our call.  Please inform patient of the message below.  Brigida Cade, CMA

## 2019-07-26 NOTE — TELEPHONE ENCOUNTER
LVM for patient with Dr. Madrigal's response as I triaged this patient earlier, and she requested to leave a detailed voice mail since she is at work and unable to answer. Pt to call back to clinic if needed.     Kayleen Fletcher RN on 7/26/2019 at 4:55 PM

## 2019-07-26 NOTE — TELEPHONE ENCOUNTER
Pt sent [employer] message on 7/25:    Good evening Dr. Madrigal. I am wondering when I should start feeling better? I actually feel more fatigued and sluggish. Maybe it takes time for the transfusion to work. I feel like I have the flu, hot and cold, headache and body aches, just wondering if this is normal. I start work in the morning at 6:30am tomorrow. You can call and reach me at work at [phone]. I don't get service on my cell phone while at work. Thank you and I will look forward to hearing what you think.    Ivy

## 2019-07-26 NOTE — TELEPHONE ENCOUNTER
These are all normal symptoms following her transfusion.  If she is having shortness of breath, fevers, chills, nausea, or vomiting she should go to the ER.  May be until next week until things improve.    Héctor Madrigal MD

## 2019-07-26 NOTE — TELEPHONE ENCOUNTER
This requires RN triage.  Will forward to RN pool for further review with patient.    Héctor Madrigal MD

## 2019-07-26 NOTE — TELEPHONE ENCOUNTER
Please see triage encounter for this Mychart message.     Kayleen Fletcher RN on 7/26/2019 at 9:46 AM

## 2019-08-07 DIAGNOSIS — I10 HYPERTENSION GOAL BP (BLOOD PRESSURE) < 140/90: ICD-10-CM

## 2019-08-07 DIAGNOSIS — D50.9 ANEMIA, IRON DEFICIENCY: ICD-10-CM

## 2019-08-07 LAB
ERYTHROCYTE [DISTWIDTH] IN BLOOD BY AUTOMATED COUNT: ABNORMAL % (ref 10–15)
FERRITIN SERPL-MCNC: 11 NG/ML (ref 8–252)
HCT VFR BLD AUTO: 35.3 % (ref 35–47)
HGB BLD-MCNC: 10.4 G/DL (ref 11.7–15.7)
MCH RBC QN AUTO: 22.4 PG (ref 26.5–33)
MCHC RBC AUTO-ENTMCNC: 29.5 G/DL (ref 31.5–36.5)
MCV RBC AUTO: 76 FL (ref 78–100)
PLATELET # BLD AUTO: 495 10E9/L (ref 150–450)
RBC # BLD AUTO: 4.65 10E12/L (ref 3.8–5.2)
WBC # BLD AUTO: 6.9 10E9/L (ref 4–11)

## 2019-08-07 PROCEDURE — 85027 COMPLETE CBC AUTOMATED: CPT | Performed by: FAMILY MEDICINE

## 2019-08-07 PROCEDURE — 82728 ASSAY OF FERRITIN: CPT | Performed by: FAMILY MEDICINE

## 2019-08-07 PROCEDURE — 36415 COLL VENOUS BLD VENIPUNCTURE: CPT | Performed by: FAMILY MEDICINE

## 2019-08-08 ENCOUNTER — OFFICE VISIT (OUTPATIENT)
Dept: FAMILY MEDICINE | Facility: CLINIC | Age: 44
End: 2019-08-08
Payer: MEDICAID

## 2019-08-08 VITALS
DIASTOLIC BLOOD PRESSURE: 90 MMHG | WEIGHT: 129.4 LBS | HEART RATE: 107 BPM | RESPIRATION RATE: 16 BRPM | OXYGEN SATURATION: 98 % | TEMPERATURE: 97.9 F | BODY MASS INDEX: 23.29 KG/M2 | SYSTOLIC BLOOD PRESSURE: 144 MMHG

## 2019-08-08 DIAGNOSIS — Z98.84 S/P GASTRIC BYPASS: ICD-10-CM

## 2019-08-08 DIAGNOSIS — D50.8 IRON DEFICIENCY ANEMIA SECONDARY TO INADEQUATE DIETARY IRON INTAKE: Primary | ICD-10-CM

## 2019-08-08 DIAGNOSIS — K90.9 MALABSORPTION OF IRON: ICD-10-CM

## 2019-08-08 PROCEDURE — 99214 OFFICE O/P EST MOD 30 MIN: CPT | Performed by: FAMILY MEDICINE

## 2019-08-08 RX ORDER — HEPARIN SODIUM (PORCINE) LOCK FLUSH IV SOLN 100 UNIT/ML 100 UNIT/ML
5 SOLUTION INTRAVENOUS
Status: CANCELLED | OUTPATIENT
Start: 2019-08-08

## 2019-08-08 RX ORDER — HEPARIN SODIUM,PORCINE 10 UNIT/ML
5 VIAL (ML) INTRAVENOUS
Status: CANCELLED | OUTPATIENT
Start: 2019-08-08

## 2019-08-08 ASSESSMENT — PAIN SCALES - GENERAL: PAINLEVEL: NO PAIN (0)

## 2019-08-08 NOTE — PROGRESS NOTES
Subjective     Ivy Teague is a 44 year old female who presents to clinic today for the following health issues:    HPI     Patient presents with:  Results: f/u from Transfusion  Patient states still feeling icky, and not 100% her self.           Here for follow-up post transfusion for severe anemia.  She is feeling somewhat better since then.  She has a history of iron deficiency and we do know that this is likely what led to her anemia.  She has history of gastric bypass and oral absorption is poor.  Iron level is still low even after 4 units of blood.      Hemoglobin   Date Value Ref Range Status   08/07/2019 10.4 (L) 11.7 - 15.7 g/dL Final   ]   Ferritin 11    Reviewed and updated as needed this visit by Provider  Tobacco  Allergies  Meds  Problems  Med Hx  Surg Hx  Fam Hx         Review of Systems   ROS COMP: Constitutional, HEENT, cardiovascular, pulmonary, GI, , musculoskeletal, neuro, skin, endocrine and psych systems are negative, except as otherwise noted.      Objective    BP (!) 144/90   Pulse 107   Temp 97.9  F (36.6  C) (Temporal)   Resp 16   Wt 58.7 kg (129 lb 6.4 oz)   SpO2 98%   BMI 23.29 kg/m    Body mass index is 23.29 kg/m .  Physical Exam   Constitutional: She appears well-developed and well-nourished.   Cardiovascular: Normal rate, regular rhythm, S1 normal, S2 normal and normal heart sounds.   No murmur heard.  Pulmonary/Chest: Effort normal and breath sounds normal. No respiratory distress. She has no wheezes. She has no rhonchi. She has no rales.   Neurological: She is alert.   Skin: No pallor.   Psychiatric: She exhibits a depressed mood (flat affect).                  Assessment & Plan     ASSESSMENT/ORDERS:    ICD-10-CM    1. Iron deficiency anemia secondary to inadequate dietary iron intake D50.8 DISCONTINUED: 0.9% sodium chloride BOLUS     DISCONTINUED: iron sucrose (VENOFER) 300 mg in sodium chloride 0.9 % 250 mL intermittent infusion   2. Malabsorption of iron  K90.9 DISCONTINUED: 0.9% sodium chloride BOLUS     DISCONTINUED: iron sucrose (VENOFER) 300 mg in sodium chloride 0.9 % 250 mL intermittent infusion   3. S/P gastric bypass Z98.84 DISCONTINUED: 0.9% sodium chloride BOLUS     DISCONTINUED: iron sucrose (VENOFER) 300 mg in sodium chloride 0.9 % 250 mL intermittent infusion     PLAN:  1.  She needs iron infusion.  Will get her set up with infusion therapy.    2.  She has health care maintenance with primary care provider scheduled next month.  She will follow-up with him at that time to check in on how she is doing and order follow-up ferritin and possibly repeat hemoglobin.  Further management per his recommendations.  She is free to follow-up with me at any time.      Tobacco Cessation:   reports that she has been smoking cigarettes.  She has been smoking about 1.00 pack per day. She has never used smokeless tobacco.  Tobacco Cessation Action Plan: Information offered: Patient not interested at this time            Return in about 1 month (around 9/8/2019) for next preventative visit (Physical) with PCP.    Héctor Madrigal MD  [facility]

## 2019-08-09 NOTE — NURSING NOTE
Call received in regards to infusion that was scheduled, patient has no insurance at present. Note she has rescheduled her infusion and is working on reinstating her insurance coverage. Note infusion has been rescheduled to 8/16. Shahnaz Clay LPN

## 2019-08-16 ENCOUNTER — INFUSION THERAPY VISIT (OUTPATIENT)
Dept: INFUSION THERAPY | Facility: CLINIC | Age: 44
End: 2019-08-16
Attending: FAMILY MEDICINE
Payer: MEDICAID

## 2019-08-16 VITALS
WEIGHT: 132.9 LBS | TEMPERATURE: 98 F | OXYGEN SATURATION: 99 % | SYSTOLIC BLOOD PRESSURE: 143 MMHG | HEIGHT: 63 IN | HEART RATE: 67 BPM | DIASTOLIC BLOOD PRESSURE: 86 MMHG | BODY MASS INDEX: 23.55 KG/M2 | RESPIRATION RATE: 16 BRPM

## 2019-08-16 DIAGNOSIS — D50.8 IRON DEFICIENCY ANEMIA SECONDARY TO INADEQUATE DIETARY IRON INTAKE: ICD-10-CM

## 2019-08-16 DIAGNOSIS — Z98.84 S/P GASTRIC BYPASS: ICD-10-CM

## 2019-08-16 DIAGNOSIS — K90.9 MALABSORPTION OF IRON: Primary | ICD-10-CM

## 2019-08-16 PROCEDURE — 25800030 ZZH RX IP 258 OP 636: Performed by: FAMILY MEDICINE

## 2019-08-16 PROCEDURE — 96365 THER/PROPH/DIAG IV INF INIT: CPT

## 2019-08-16 PROCEDURE — 25000128 H RX IP 250 OP 636: Performed by: FAMILY MEDICINE

## 2019-08-16 PROCEDURE — 96366 THER/PROPH/DIAG IV INF ADDON: CPT

## 2019-08-16 RX ORDER — HEPARIN SODIUM (PORCINE) LOCK FLUSH IV SOLN 100 UNIT/ML 100 UNIT/ML
5 SOLUTION INTRAVENOUS
Status: CANCELLED | OUTPATIENT
Start: 2019-08-18

## 2019-08-16 RX ORDER — HEPARIN SODIUM,PORCINE 10 UNIT/ML
5 VIAL (ML) INTRAVENOUS
Status: CANCELLED | OUTPATIENT
Start: 2019-08-18

## 2019-08-16 RX ADMIN — IRON SUCROSE 300 MG: 20 INJECTION, SOLUTION INTRAVENOUS at 08:52

## 2019-08-16 RX ADMIN — SODIUM CHLORIDE 250 ML: 9 INJECTION, SOLUTION INTRAVENOUS at 08:47

## 2019-08-16 ASSESSMENT — PAIN SCALES - GENERAL: PAINLEVEL: NO PAIN (0)

## 2019-08-16 ASSESSMENT — MIFFLIN-ST. JEOR: SCORE: 1204.02

## 2019-08-16 NOTE — PATIENT INSTRUCTIONS
Pt to return on 08/21/19 for Venofer #2 of 3. Copies of medication list and upcoming appointments given prior to discharge.

## 2019-08-16 NOTE — PROGRESS NOTES
Infusion Nursing Note:  Ivy Ann Nomikatina presents today for Venofer #1 of 3.    Patient seen by provider today: No   present during visit today: Not Applicable.    Note: Patient arrived by self, all questions answered regarding iron infusion.    Intravenous Access:  Peripheral IV placed.    Treatment Conditions:  Not Applicable.      Post Infusion Assessment:  Patient tolerated infusion without incident.  Patient observed for 15 minutes post infusion per protocol.  Blood return noted pre and post infusion.  Site patent and intact, free from redness, edema or discomfort.  No evidence of extravasations.  Access discontinued per protocol.       Discharge Plan:   Discharge instructions reviewed with: Patient.  Patient and/or family verbalized understanding of discharge instructions and all questions answered.  Patient discharged in stable condition accompanied by: self.  Departure Mode: Ambulatory.    Yesenia Herrera RN

## 2019-08-21 ENCOUNTER — INFUSION THERAPY VISIT (OUTPATIENT)
Dept: INFUSION THERAPY | Facility: CLINIC | Age: 44
End: 2019-08-21
Attending: FAMILY MEDICINE
Payer: MEDICAID

## 2019-08-21 VITALS
BODY MASS INDEX: 23.97 KG/M2 | WEIGHT: 133.2 LBS | DIASTOLIC BLOOD PRESSURE: 80 MMHG | SYSTOLIC BLOOD PRESSURE: 144 MMHG | TEMPERATURE: 97.9 F | HEART RATE: 60 BPM | RESPIRATION RATE: 12 BRPM | OXYGEN SATURATION: 100 %

## 2019-08-21 DIAGNOSIS — Z98.84 S/P GASTRIC BYPASS: ICD-10-CM

## 2019-08-21 DIAGNOSIS — K90.9 MALABSORPTION OF IRON: Primary | ICD-10-CM

## 2019-08-21 DIAGNOSIS — D50.8 IRON DEFICIENCY ANEMIA SECONDARY TO INADEQUATE DIETARY IRON INTAKE: ICD-10-CM

## 2019-08-21 PROCEDURE — 96366 THER/PROPH/DIAG IV INF ADDON: CPT

## 2019-08-21 PROCEDURE — 25000128 H RX IP 250 OP 636: Performed by: FAMILY MEDICINE

## 2019-08-21 PROCEDURE — 96365 THER/PROPH/DIAG IV INF INIT: CPT

## 2019-08-21 PROCEDURE — 25800030 ZZH RX IP 258 OP 636: Performed by: FAMILY MEDICINE

## 2019-08-21 RX ORDER — HEPARIN SODIUM (PORCINE) LOCK FLUSH IV SOLN 100 UNIT/ML 100 UNIT/ML
5 SOLUTION INTRAVENOUS
Status: CANCELLED | OUTPATIENT
Start: 2019-08-23

## 2019-08-21 RX ORDER — HEPARIN SODIUM,PORCINE 10 UNIT/ML
5 VIAL (ML) INTRAVENOUS
Status: CANCELLED | OUTPATIENT
Start: 2019-08-23

## 2019-08-21 RX ADMIN — SODIUM CHLORIDE 250 ML: 9 INJECTION, SOLUTION INTRAVENOUS at 09:19

## 2019-08-21 RX ADMIN — IRON SUCROSE 300 MG: 20 INJECTION, SOLUTION INTRAVENOUS at 09:44

## 2019-08-21 ASSESSMENT — PAIN SCALES - GENERAL: PAINLEVEL: NO PAIN (0)

## 2019-08-21 NOTE — PROGRESS NOTES
Infusion Nursing Note:  Ivy Ann Zahida presents today for Venofer, 2nd dose.    Patient seen by provider today: No   present during visit today: Not Applicable.    Note: N/A.    Intravenous Access:  Peripheral IV placed.    Treatment Conditions:  Not Applicable.      Post Infusion Assessment:  Patient tolerated infusion without incident.  Patient observed for 15 minutes post venofer per protocol.  Site patent and intact, free from redness, edema or discomfort.  No evidence of extravasations.  Access discontinued per protocol.       Discharge Plan:   Discharge instructions reviewed with: Patient.  Patient discharged in stable condition accompanied by: self.  Departure Mode: Ambulatory.    Ariana Singh, RN, RN

## 2019-08-26 ENCOUNTER — OFFICE VISIT (OUTPATIENT)
Dept: SPIRITUAL SERVICES | Facility: CLINIC | Age: 44
End: 2019-08-26

## 2019-08-26 ENCOUNTER — INFUSION THERAPY VISIT (OUTPATIENT)
Dept: INFUSION THERAPY | Facility: CLINIC | Age: 44
End: 2019-08-26
Attending: FAMILY MEDICINE
Payer: MEDICAID

## 2019-08-26 VITALS
HEART RATE: 81 BPM | BODY MASS INDEX: 23.61 KG/M2 | WEIGHT: 131.2 LBS | TEMPERATURE: 98.7 F | OXYGEN SATURATION: 98 % | DIASTOLIC BLOOD PRESSURE: 86 MMHG | RESPIRATION RATE: 16 BRPM | SYSTOLIC BLOOD PRESSURE: 149 MMHG

## 2019-08-26 DIAGNOSIS — K90.9 MALABSORPTION OF IRON: Primary | ICD-10-CM

## 2019-08-26 DIAGNOSIS — Z98.84 S/P GASTRIC BYPASS: ICD-10-CM

## 2019-08-26 DIAGNOSIS — D50.8 IRON DEFICIENCY ANEMIA SECONDARY TO INADEQUATE DIETARY IRON INTAKE: ICD-10-CM

## 2019-08-26 PROCEDURE — 96365 THER/PROPH/DIAG IV INF INIT: CPT

## 2019-08-26 PROCEDURE — 25800030 ZZH RX IP 258 OP 636: Performed by: FAMILY MEDICINE

## 2019-08-26 PROCEDURE — 25000128 H RX IP 250 OP 636: Performed by: FAMILY MEDICINE

## 2019-08-26 RX ORDER — HEPARIN SODIUM,PORCINE 10 UNIT/ML
5 VIAL (ML) INTRAVENOUS
Status: CANCELLED | OUTPATIENT
Start: 2019-08-26

## 2019-08-26 RX ORDER — HEPARIN SODIUM (PORCINE) LOCK FLUSH IV SOLN 100 UNIT/ML 100 UNIT/ML
5 SOLUTION INTRAVENOUS
Status: CANCELLED | OUTPATIENT
Start: 2019-08-26

## 2019-08-26 RX ADMIN — IRON SUCROSE 300 MG: 20 INJECTION, SOLUTION INTRAVENOUS at 09:07

## 2019-08-26 RX ADMIN — SODIUM CHLORIDE 250 ML: 9 INJECTION, SOLUTION INTRAVENOUS at 09:01

## 2019-08-26 ASSESSMENT — PAIN SCALES - GENERAL: PAINLEVEL: NO PAIN (0)

## 2019-08-26 NOTE — PROGRESS NOTES
Infusion Nursing Note:  Ivy Teague presents today for Venofer # 3.    Patient seen by provider today: No   present during visit today: Not Applicable.    Note: N/A.    Intravenous Access:  Peripheral IV placed.    Treatment Conditions:  Not Applicable.      Post Infusion Assessment:  Patient tolerated infusion without incident.  Patient observed for 10 minutes post venofer per protocol.  Blood return noted pre and post infusion.  Site patent and intact, free from redness, edema or discomfort.  No evidence of extravasations.  Access discontinued per protocol.       Discharge Plan:   Discharge instructions reviewed with: Patient.  Patient and/or family verbalized understanding of discharge instructions and all questions answered.  Patient will return follow-up with PCP for next appointment.   Patient discharged in stable condition accompanied by: self.  Departure Mode: Ambulatory.    Zaida Garcia, RN, RN

## 2019-08-26 NOTE — PROGRESS NOTES
"SPIRITUAL HEALTH SERVICES  SPIRITUAL ASSESSMENT Progress Note  [facility]       introduced himself to Ivy Teague and informed her of his availability.  Genaro - Ayo called her care \"wonderful\".    Hussein Owens M.Div., [facility]  Staff   Office tel: [phone]    "

## 2019-09-05 ENCOUNTER — HOSPITAL ENCOUNTER (EMERGENCY)
Facility: CLINIC | Age: 44
Discharge: ANOTHER HOSPITAL | End: 2019-09-06
Attending: FAMILY MEDICINE | Admitting: FAMILY MEDICINE
Payer: COMMERCIAL

## 2019-09-05 ENCOUNTER — APPOINTMENT (OUTPATIENT)
Dept: CT IMAGING | Facility: CLINIC | Age: 44
End: 2019-09-05
Attending: FAMILY MEDICINE
Payer: COMMERCIAL

## 2019-09-05 DIAGNOSIS — S41.112A LACERATION OF LEFT UPPER EXTREMITY, INITIAL ENCOUNTER: ICD-10-CM

## 2019-09-05 DIAGNOSIS — F33.1 MODERATE RECURRENT MAJOR DEPRESSION (H): ICD-10-CM

## 2019-09-05 DIAGNOSIS — I10 HYPERTENSION GOAL BP (BLOOD PRESSURE) < 140/90: ICD-10-CM

## 2019-09-05 DIAGNOSIS — F33.0 MAJOR DEPRESSIVE DISORDER, RECURRENT EPISODE, MILD (H): ICD-10-CM

## 2019-09-05 DIAGNOSIS — F43.10 POSTTRAUMATIC STRESS DISORDER: ICD-10-CM

## 2019-09-05 DIAGNOSIS — F10.929 ALCOHOLIC INTOXICATION WITH COMPLICATION (H): ICD-10-CM

## 2019-09-05 DIAGNOSIS — F41.1 GAD (GENERALIZED ANXIETY DISORDER): ICD-10-CM

## 2019-09-05 DIAGNOSIS — F17.200 TOBACCO USE DISORDER: ICD-10-CM

## 2019-09-05 DIAGNOSIS — T14.91XA SUICIDE ATTEMPT (H): ICD-10-CM

## 2019-09-05 DIAGNOSIS — Z98.84 S/P GASTRIC BYPASS: ICD-10-CM

## 2019-09-05 LAB
ALBUMIN SERPL-MCNC: 3.6 G/DL (ref 3.4–5)
ALP SERPL-CCNC: 53 U/L (ref 40–150)
ALT SERPL W P-5'-P-CCNC: 50 U/L (ref 0–50)
ANION GAP SERPL CALCULATED.3IONS-SCNC: 19 MMOL/L (ref 3–14)
APAP SERPL-MCNC: <2 MG/L (ref 10–20)
APTT PPP: 24 SEC (ref 22–37)
AST SERPL W P-5'-P-CCNC: 119 U/L (ref 0–45)
BASOPHILS # BLD AUTO: 0.1 10E9/L (ref 0–0.2)
BASOPHILS NFR BLD AUTO: 0.8 %
BILIRUB SERPL-MCNC: 1.3 MG/DL (ref 0.2–1.3)
BUN SERPL-MCNC: 21 MG/DL (ref 7–30)
CALCIUM SERPL-MCNC: 8.4 MG/DL (ref 8.5–10.1)
CHLORIDE SERPL-SCNC: 102 MMOL/L (ref 94–109)
CO2 SERPL-SCNC: 15 MMOL/L (ref 20–32)
CREAT SERPL-MCNC: 0.49 MG/DL (ref 0.52–1.04)
DIFFERENTIAL METHOD BLD: ABNORMAL
EOSINOPHIL NFR BLD AUTO: 0 %
ERYTHROCYTE [DISTWIDTH] IN BLOOD BY AUTOMATED COUNT: 28.4 % (ref 10–15)
ETHANOL SERPL-MCNC: 0.05 G/DL
GFR SERPL CREATININE-BSD FRML MDRD: >90 ML/MIN/{1.73_M2}
GLUCOSE SERPL-MCNC: 70 MG/DL (ref 70–99)
HCT VFR BLD AUTO: 38.7 % (ref 35–47)
HGB BLD-MCNC: 12.5 G/DL (ref 11.7–15.7)
IMM GRANULOCYTES # BLD: 0.1 10E9/L (ref 0–0.4)
IMM GRANULOCYTES NFR BLD: 0.4 %
INR PPP: 1.02 (ref 0.86–1.14)
LACTATE BLD-SCNC: 5.8 MMOL/L (ref 0.7–2)
LYMPHOCYTES # BLD AUTO: 1.3 10E9/L (ref 0.8–5.3)
LYMPHOCYTES NFR BLD AUTO: 7.7 %
MCH RBC QN AUTO: 26.1 PG (ref 26.5–33)
MCHC RBC AUTO-ENTMCNC: 32.3 G/DL (ref 31.5–36.5)
MCV RBC AUTO: 81 FL (ref 78–100)
MONOCYTES # BLD AUTO: 0.8 10E9/L (ref 0–1.3)
MONOCYTES NFR BLD AUTO: 4.7 %
NEUTROPHILS # BLD AUTO: 14.5 10E9/L (ref 1.6–8.3)
NEUTROPHILS NFR BLD AUTO: 86.4 %
NRBC # BLD AUTO: 0 10*3/UL
NRBC BLD AUTO-RTO: 0 /100
PLATELET # BLD AUTO: 550 10E9/L (ref 150–450)
POTASSIUM SERPL-SCNC: 4.4 MMOL/L (ref 3.4–5.3)
PROT SERPL-MCNC: 7.6 G/DL (ref 6.8–8.8)
RBC # BLD AUTO: 4.79 10E12/L (ref 3.8–5.2)
SALICYLATES SERPL-MCNC: 6 MG/DL
SODIUM SERPL-SCNC: 136 MMOL/L (ref 133–144)
WBC # BLD AUTO: 16.8 10E9/L (ref 4–11)

## 2019-09-05 PROCEDURE — 74177 CT ABD & PELVIS W/CONTRAST: CPT

## 2019-09-05 PROCEDURE — 96375 TX/PRO/DX INJ NEW DRUG ADDON: CPT

## 2019-09-05 PROCEDURE — 80320 DRUG SCREEN QUANTALCOHOLS: CPT | Performed by: FAMILY MEDICINE

## 2019-09-05 PROCEDURE — 96376 TX/PRO/DX INJ SAME DRUG ADON: CPT

## 2019-09-05 PROCEDURE — 36415 COLL VENOUS BLD VENIPUNCTURE: CPT | Performed by: FAMILY MEDICINE

## 2019-09-05 PROCEDURE — 25000125 ZZHC RX 250: Performed by: FAMILY MEDICINE

## 2019-09-05 PROCEDURE — 80329 ANALGESICS NON-OPIOID 1 OR 2: CPT | Mod: 91 | Performed by: FAMILY MEDICINE

## 2019-09-05 PROCEDURE — 85730 THROMBOPLASTIN TIME PARTIAL: CPT | Performed by: FAMILY MEDICINE

## 2019-09-05 PROCEDURE — 25000132 ZZH RX MED GY IP 250 OP 250 PS 637: Performed by: FAMILY MEDICINE

## 2019-09-05 PROCEDURE — 96361 HYDRATE IV INFUSION ADD-ON: CPT

## 2019-09-05 PROCEDURE — 80053 COMPREHEN METABOLIC PANEL: CPT | Performed by: FAMILY MEDICINE

## 2019-09-05 PROCEDURE — 85025 COMPLETE CBC W/AUTO DIFF WBC: CPT | Performed by: FAMILY MEDICINE

## 2019-09-05 PROCEDURE — 96365 THER/PROPH/DIAG IV INF INIT: CPT | Mod: 59

## 2019-09-05 PROCEDURE — 80329 ANALGESICS NON-OPIOID 1 OR 2: CPT | Performed by: FAMILY MEDICINE

## 2019-09-05 PROCEDURE — 83605 ASSAY OF LACTIC ACID: CPT | Performed by: FAMILY MEDICINE

## 2019-09-05 PROCEDURE — 85610 PROTHROMBIN TIME: CPT | Performed by: FAMILY MEDICINE

## 2019-09-05 PROCEDURE — 25000128 H RX IP 250 OP 636: Performed by: FAMILY MEDICINE

## 2019-09-05 PROCEDURE — 99285 EMERGENCY DEPT VISIT HI MDM: CPT | Mod: 25

## 2019-09-05 PROCEDURE — 25800030 ZZH RX IP 258 OP 636: Performed by: FAMILY MEDICINE

## 2019-09-05 PROCEDURE — 99285 EMERGENCY DEPT VISIT HI MDM: CPT | Mod: Z6 | Performed by: FAMILY MEDICINE

## 2019-09-05 PROCEDURE — 90791 PSYCH DIAGNOSTIC EVALUATION: CPT

## 2019-09-05 RX ORDER — ONDANSETRON 2 MG/ML
4 INJECTION INTRAMUSCULAR; INTRAVENOUS EVERY 30 MIN PRN
Status: DISCONTINUED | OUTPATIENT
Start: 2019-09-05 | End: 2019-09-06 | Stop reason: HOSPADM

## 2019-09-05 RX ORDER — LORAZEPAM 2 MG/ML
1 INJECTION INTRAMUSCULAR ONCE
Status: COMPLETED | OUTPATIENT
Start: 2019-09-05 | End: 2019-09-05

## 2019-09-05 RX ORDER — IOPAMIDOL 755 MG/ML
500 INJECTION, SOLUTION INTRAVASCULAR ONCE
Status: COMPLETED | OUTPATIENT
Start: 2019-09-05 | End: 2019-09-05

## 2019-09-05 RX ORDER — SODIUM CHLORIDE 9 MG/ML
1000 INJECTION, SOLUTION INTRAVENOUS CONTINUOUS
Status: DISCONTINUED | OUTPATIENT
Start: 2019-09-05 | End: 2019-09-06 | Stop reason: HOSPADM

## 2019-09-05 RX ORDER — ACETAMINOPHEN 500 MG
1000 TABLET ORAL ONCE
Status: COMPLETED | OUTPATIENT
Start: 2019-09-05 | End: 2019-09-05

## 2019-09-05 RX ORDER — LORAZEPAM 2 MG/ML
1 INJECTION INTRAMUSCULAR
Status: DISCONTINUED | OUTPATIENT
Start: 2019-09-05 | End: 2019-09-06 | Stop reason: HOSPADM

## 2019-09-05 RX ADMIN — SODIUM CHLORIDE 1000 ML: 9 INJECTION, SOLUTION INTRAVENOUS at 18:29

## 2019-09-05 RX ADMIN — SODIUM CHLORIDE 60 ML: 9 INJECTION, SOLUTION INTRAVENOUS at 14:29

## 2019-09-05 RX ADMIN — ONDANSETRON 4 MG: 2 INJECTION INTRAMUSCULAR; INTRAVENOUS at 18:25

## 2019-09-05 RX ADMIN — LORAZEPAM 1 MG: 2 INJECTION INTRAMUSCULAR; INTRAVENOUS at 15:51

## 2019-09-05 RX ADMIN — LORAZEPAM 1 MG: 2 INJECTION INTRAMUSCULAR; INTRAVENOUS at 21:51

## 2019-09-05 RX ADMIN — ACETAMINOPHEN 1000 MG: 500 TABLET ORAL at 23:27

## 2019-09-05 RX ADMIN — LORAZEPAM 1 MG: 2 INJECTION INTRAMUSCULAR; INTRAVENOUS at 18:24

## 2019-09-05 RX ADMIN — SODIUM CHLORIDE 1000 ML: 9 INJECTION, SOLUTION INTRAVENOUS at 17:01

## 2019-09-05 RX ADMIN — FOLIC ACID: 5 INJECTION, SOLUTION INTRAMUSCULAR; INTRAVENOUS; SUBCUTANEOUS at 15:29

## 2019-09-05 RX ADMIN — IOPAMIDOL 70 ML: 755 INJECTION, SOLUTION INTRAVENOUS at 14:29
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
## 2019-09-06 ENCOUNTER — HOSPITAL ENCOUNTER (INPATIENT)
Facility: CLINIC | Age: 44
LOS: 4 days | Discharge: HOME | DRG: 885 | End: 2019-09-10
Attending: PSYCHIATRY & NEUROLOGY | Admitting: PSYCHIATRY & NEUROLOGY
Payer: COMMERCIAL

## 2019-09-06 VITALS
SYSTOLIC BLOOD PRESSURE: 160 MMHG | DIASTOLIC BLOOD PRESSURE: 91 MMHG | RESPIRATION RATE: 17 BRPM | TEMPERATURE: 97.4 F | OXYGEN SATURATION: 98 % | HEART RATE: 103 BPM

## 2019-09-06 DIAGNOSIS — F33.1 MODERATE RECURRENT MAJOR DEPRESSION (H): Primary | ICD-10-CM

## 2019-09-06 DIAGNOSIS — I10 HYPERTENSION GOAL BP (BLOOD PRESSURE) < 140/90: ICD-10-CM

## 2019-09-06 PROBLEM — T14.91XA SUICIDAL BEHAVIOR WITH ATTEMPTED SELF-INJURY (H): Status: ACTIVE | Noted: 2019-09-06

## 2019-09-06 PROCEDURE — 25000132 ZZH RX MED GY IP 250 OP 250 PS 637: Performed by: PSYCHIATRY & NEUROLOGY

## 2019-09-06 PROCEDURE — 25000132 ZZH RX MED GY IP 250 OP 250 PS 637: Performed by: NURSE PRACTITIONER

## 2019-09-06 PROCEDURE — 12400001 ZZH R&B MH UMMC

## 2019-09-06 PROCEDURE — HZ2ZZZZ DETOXIFICATION SERVICES FOR SUBSTANCE ABUSE TREATMENT: ICD-10-PCS | Performed by: PSYCHIATRY & NEUROLOGY

## 2019-09-06 PROCEDURE — 99223 1ST HOSP IP/OBS HIGH 75: CPT | Mod: AI | Performed by: PSYCHIATRY & NEUROLOGY

## 2019-09-06 RX ORDER — NICOTINE 21 MG/24HR
1 PATCH, TRANSDERMAL 24 HOURS TRANSDERMAL DAILY
Status: DISCONTINUED | OUTPATIENT
Start: 2019-09-06 | End: 2019-09-10 | Stop reason: HOSPADM

## 2019-09-06 RX ORDER — OLANZAPINE 10 MG/1
10 TABLET ORAL
Status: DISCONTINUED | OUTPATIENT
Start: 2019-09-06 | End: 2019-09-06

## 2019-09-06 RX ORDER — LANOLIN ALCOHOL/MO/W.PET/CERES
100 CREAM (GRAM) TOPICAL DAILY
Status: DISCONTINUED | OUTPATIENT
Start: 2019-09-06 | End: 2019-09-10 | Stop reason: HOSPADM

## 2019-09-06 RX ORDER — HYDROXYZINE HYDROCHLORIDE 25 MG/1
25 TABLET, FILM COATED ORAL EVERY 4 HOURS PRN
Status: DISCONTINUED | OUTPATIENT
Start: 2019-09-06 | End: 2019-09-10 | Stop reason: HOSPADM

## 2019-09-06 RX ORDER — TRAZODONE HYDROCHLORIDE 50 MG/1
50 TABLET, FILM COATED ORAL
Status: DISCONTINUED | OUTPATIENT
Start: 2019-09-06 | End: 2019-09-09 | Stop reason: CLARIF

## 2019-09-06 RX ORDER — DIAZEPAM 5 MG
5-20 TABLET ORAL EVERY 30 MIN PRN
Status: DISCONTINUED | OUTPATIENT
Start: 2019-09-06 | End: 2019-09-09

## 2019-09-06 RX ORDER — MULTIPLE VITAMINS W/ MINERALS TAB 9MG-400MCG
1 TAB ORAL DAILY
Status: DISCONTINUED | OUTPATIENT
Start: 2019-09-06 | End: 2019-09-10 | Stop reason: HOSPADM

## 2019-09-06 RX ORDER — OLANZAPINE 10 MG/2ML
10 INJECTION, POWDER, FOR SOLUTION INTRAMUSCULAR
Status: DISCONTINUED | OUTPATIENT
Start: 2019-09-06 | End: 2019-09-06

## 2019-09-06 RX ORDER — ALUMINA, MAGNESIA, AND SIMETHICONE 2400; 2400; 240 MG/30ML; MG/30ML; MG/30ML
30 SUSPENSION ORAL EVERY 4 HOURS PRN
Status: DISCONTINUED | OUTPATIENT
Start: 2019-09-06 | End: 2019-09-10 | Stop reason: HOSPADM

## 2019-09-06 RX ORDER — ACETAMINOPHEN 500 MG
1000 TABLET ORAL EVERY 6 HOURS PRN
Status: DISCONTINUED | OUTPATIENT
Start: 2019-09-06 | End: 2019-09-10 | Stop reason: HOSPADM

## 2019-09-06 RX ORDER — LISINOPRIL 20 MG/1
20 TABLET ORAL DAILY
Status: DISCONTINUED | OUTPATIENT
Start: 2019-09-06 | End: 2019-09-10 | Stop reason: HOSPADM

## 2019-09-06 RX ORDER — FOLIC ACID 1 MG/1
1 TABLET ORAL DAILY
Status: DISCONTINUED | OUTPATIENT
Start: 2019-09-06 | End: 2019-09-10 | Stop reason: HOSPADM

## 2019-09-06 RX ORDER — ATENOLOL 50 MG/1
50 TABLET ORAL DAILY PRN
Status: DISCONTINUED | OUTPATIENT
Start: 2019-09-06 | End: 2019-09-10 | Stop reason: HOSPADM

## 2019-09-06 RX ORDER — CITALOPRAM HYDROBROMIDE 10 MG/1
10 TABLET ORAL DAILY
Status: DISCONTINUED | OUTPATIENT
Start: 2019-09-06 | End: 2019-09-10 | Stop reason: HOSPADM

## 2019-09-06 RX ORDER — BISACODYL 10 MG
10 SUPPOSITORY, RECTAL RECTAL DAILY PRN
Status: DISCONTINUED | OUTPATIENT
Start: 2019-09-06 | End: 2019-09-10 | Stop reason: HOSPADM

## 2019-09-06 RX ADMIN — DIAZEPAM 10 MG: 5 TABLET ORAL at 16:52

## 2019-09-06 RX ADMIN — LISINOPRIL 20 MG: 20 TABLET ORAL at 08:56

## 2019-09-06 RX ADMIN — MULTIPLE VITAMINS W/ MINERALS TAB 1 TABLET: TAB at 08:56

## 2019-09-06 RX ADMIN — FOLIC ACID 1 MG: 1 TABLET ORAL at 08:56

## 2019-09-06 RX ADMIN — NICOTINE 1 PATCH: 14 PATCH, EXTENDED RELEASE TRANSDERMAL at 08:56

## 2019-09-06 RX ADMIN — THIAMINE HCL TAB 100 MG 100 MG: 100 TAB at 08:56

## 2019-09-06 RX ADMIN — DIAZEPAM 10 MG: 5 TABLET ORAL at 08:56

## 2019-09-06 RX ADMIN — CITALOPRAM HYDROBROMIDE 10 MG: 10 TABLET ORAL at 16:52

## 2019-09-06 ASSESSMENT — ACTIVITIES OF DAILY LIVING (ADL)
ORAL_HYGIENE: INDEPENDENT
HYGIENE/GROOMING: INDEPENDENT
BATHING: 0-->INDEPENDENT
DRESS: SCRUBS (BEHAVIORAL HEALTH)
DRESS: 0-->INDEPENDENT
TOILETING: 0-->INDEPENDENT
LAUNDRY: WITH SUPERVISION
RETIRED_COMMUNICATION: 0-->UNDERSTANDS/COMMUNICATES WITHOUT DIFFICULTY
LAUNDRY: WITH SUPERVISION
RETIRED_EATING: 0-->INDEPENDENT
TRANSFERRING: 0-->INDEPENDENT
HYGIENE/GROOMING: INDEPENDENT
SWALLOWING: 0-->SWALLOWS FOODS/LIQUIDS WITHOUT DIFFICULTY
DRESS: INDEPENDENT
COGNITION: 0 - NO COGNITION ISSUES REPORTED
ORAL_HYGIENE: INDEPENDENT
AMBULATION: 0-->INDEPENDENT
FALL_HISTORY_WITHIN_LAST_SIX_MONTHS: NO

## 2019-09-06 NOTE — PLAN OF CARE
BEHAVIORAL TEAM DISCUSSION    Participants: Dr. Kayden Couch MD, Lauren Anderson RN, Weston Verma LPC  Progress: New Admit  Anticipated length of stay: 7 days, to be reevaluated upon assessment  Continued Stay Criteria/Rationale: Evaluate and assessment  Medical/Physical: MSSA protocols  Precautions:   Behavioral Orders   Procedures    Code 1 - Restrict to Unit    Routine Programming     As clinically indicated    Self Injury Precaution    Status 15     Every 15 minutes.    Suicide precautions     Patients on Suicide Precautions should have a Combination Diet ordered that includes a Diet selection(s) AND a Behavioral Tray selection for Safe Tray - with utensils, or Safe Tray - NO utensils      Withdrawal precautions     Plan: Continue to evaluate and assess  Rationale for change in precautions or plan: None

## 2019-09-06 NOTE — ED NOTES
Attempted to contact pts mom to let her know that pt was being transferred at this time. Mom did not answer phone at this time, message left requesting that she call the ED.

## 2019-09-06 NOTE — PROGRESS NOTES
"Pt reported her goal for the day was to \"get settled.\" Pt remained isolative to her room for majority of day shift but came out for meals. Upon check in pt denied symptoms of depression and anxiety stating \"I just feel tae off, confused and light headed.\" Pt denies psychotic symptoms. Pt denies SI and SIB. Pt reports \"normal\" concentration and sleep. Pt reports a decrease in appetite but stated \"I'm trying to make myself eat.\" Pt was calm and cooperative w/staff. PT had an uneventful shift.       09/06/19 1300   Behavioral Health   Hallucinations denies / not responding to hallucinations   Thinking poor concentration   Orientation person: oriented;place: oriented;date: oriented;time: oriented   Memory baseline memory   Insight insight appropriate to situation   Judgement impaired   Eye Contact at examiner   Affect blunted, flat   Mood mood is calm   Physical Appearance/Attire attire appropriate to age and situation   Hygiene well groomed   Suicidality   (Denies sI)   1. Wish to be Dead (Past Month) No   2. Non-Specific Active Suicidal Thoughts (Past Month) No   Self Injury   (Denies SIB)   Elopement   (No elopment concerns)   Activity withdrawn;isolative   Speech coherent;clear   Medication Sensitivity no stated side effects;no observed side effects   Psychomotor / Gait balanced;steady   Modified Selective Severity Assessment (MSSA)   Eating Disturbances 1   Tremor 1   Sleep Disturbance 0   Clouding of Sensorium 0   Hallucinations 0   Quality of Contact 0   Agitation 0   Paroxysmal Sweats 0   Temperature 1   Pulse 4   Total MSSA Score 7   Activities of Daily Living   Hygiene/Grooming independent   Oral Hygiene independent   Dress independent   Laundry with supervision   Room Organization independent     "
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
## 2019-09-06 NOTE — ED PROVIDER NOTES
Patient is bordering in our emergency room awaiting placement at [facility].  Patient initially evaluated by Dr. Friend.  Patient was not signed out to me as Dr. Friend was no longer in the ER on my arrival for my shift but I have been monitoring the patient.  She has remained stable on a continuous watch.  It is hoped that at shift change at [facility] that they will allow us to give nurse to nurse report and then we can transport the patient.  We will continue to monitor.     Sin Mcneal, DO  09/06/19 0124

## 2019-09-06 NOTE — ED NOTES
Called [facility] for update on report/bed status and was informed they were not able to take report until after 2330 at some point and was hoping to be able to on the next shift.

## 2019-09-06 NOTE — PROGRESS NOTES
SPIRITUAL HEALTH SERVICES  [facility] ([facility]) [address]   ON-CALL VISIT    REFERRAL SOURCE: patient/family request at admission for chaplaincy support    Attempted visit with patient who was sleeping at the time of attempt.     PLAN: Based on anticipated length of stay, I will follow up with patient Monday when I return to the hospital.      Vi Ng MDiv, [facility]  Staff   Pager [phone]

## 2019-09-06 NOTE — PROGRESS NOTES
09/06/19 0432   Patient Belongings   Did you bring any home meds/supplements to the hospital?  No   Patient Belongings locker   Patient Belongings Put in Hospital Secure Location (Security or Locker, etc.) clothing;glasses;shoes;other (see comments)   Belongings Search Yes   Clothing Search Yes   Second Staff Jerrica (RN)      On patient:  Glasses    In locker:   Blue t-shirt, pants, bra, underwear grey slip on shoes, [place] ID.         A               Admission:  I am responsible for any personal items that are not sent to the safe or pharmacy.  [facility] is not responsible for loss, theft or damage of any property in my possession.    Signature:  _________________________________ Date: _______  Time: _____                                              Staff Signature:  ____________________________ Date: ________  Time: _____      2nd Staff person, if patient is unable/unwilling to sign:    Signature: ________________________________ Date: ________  Time: _____     Discharge:  [facility] has returned all of my personal belongings:    Signature: _________________________________ Date: ________  Time: _____                                          Staff Signature:  ____________________________ Date: ________  Time: _____

## 2019-09-06 NOTE — PLAN OF CARE
Illness Management Recovery model: Objectives    Patient will identify reason(s) for hospitalization from their perspective.  Patient will identify a minimum of three goals for discharge.  Patient will identify a minimum of three triggers that may increase their symptoms.  Patient will identify a minimum of three coping skills they can do to stay well.  Patient will identify their support system to demonstrate readiness for discharge.
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
## 2019-09-07 LAB
BASOPHILS # BLD AUTO: 0 10E9/L (ref 0–0.2)
BASOPHILS NFR BLD AUTO: 0.5 %
DIFFERENTIAL METHOD BLD: ABNORMAL
EOSINOPHIL # BLD AUTO: 0.1 10E9/L (ref 0–0.7)
EOSINOPHIL NFR BLD AUTO: 1.5 %
ERYTHROCYTE [DISTWIDTH] IN BLOOD BY AUTOMATED COUNT: 26.2 % (ref 10–15)
FOLATE SERPL-MCNC: 9.9 NG/ML
HCT VFR BLD AUTO: 37.6 % (ref 35–47)
HGB BLD-MCNC: 11.9 G/DL (ref 11.7–15.7)
IMM GRANULOCYTES # BLD: 0 10E9/L (ref 0–0.4)
IMM GRANULOCYTES NFR BLD: 0.2 %
LYMPHOCYTES # BLD AUTO: 1.3 10E9/L (ref 0.8–5.3)
LYMPHOCYTES NFR BLD AUTO: 21.8 %
MCH RBC QN AUTO: 25.9 PG (ref 26.5–33)
MCHC RBC AUTO-ENTMCNC: 31.6 G/DL (ref 31.5–36.5)
MCV RBC AUTO: 82 FL (ref 78–100)
MONOCYTES # BLD AUTO: 0.3 10E9/L (ref 0–1.3)
MONOCYTES NFR BLD AUTO: 5.3 %
NEUTROPHILS # BLD AUTO: 4.3 10E9/L (ref 1.6–8.3)
NEUTROPHILS NFR BLD AUTO: 70.7 %
NRBC # BLD AUTO: 0 10*3/UL
NRBC BLD AUTO-RTO: 0 /100
PLATELET # BLD AUTO: 355 10E9/L (ref 150–450)
RBC # BLD AUTO: 4.59 10E12/L (ref 3.8–5.2)
TSH SERPL DL<=0.005 MIU/L-ACNC: 2 MU/L (ref 0.4–4)
VIT B12 SERPL-MCNC: 586 PG/ML (ref 193–986)
WBC # BLD AUTO: 6.1 10E9/L (ref 4–11)

## 2019-09-07 PROCEDURE — 85025 COMPLETE CBC W/AUTO DIFF WBC: CPT | Performed by: PSYCHIATRY & NEUROLOGY

## 2019-09-07 PROCEDURE — 82746 ASSAY OF FOLIC ACID SERUM: CPT | Performed by: PSYCHIATRY & NEUROLOGY

## 2019-09-07 PROCEDURE — 36415 COLL VENOUS BLD VENIPUNCTURE: CPT | Performed by: PSYCHIATRY & NEUROLOGY

## 2019-09-07 PROCEDURE — 12400001 ZZH R&B MH UMMC

## 2019-09-07 PROCEDURE — H2032 ACTIVITY THERAPY, PER 15 MIN: HCPCS

## 2019-09-07 PROCEDURE — 84443 ASSAY THYROID STIM HORMONE: CPT | Performed by: PSYCHIATRY & NEUROLOGY

## 2019-09-07 PROCEDURE — 82607 VITAMIN B-12: CPT | Performed by: PSYCHIATRY & NEUROLOGY

## 2019-09-07 PROCEDURE — 25000132 ZZH RX MED GY IP 250 OP 250 PS 637: Performed by: NURSE PRACTITIONER

## 2019-09-07 PROCEDURE — 25000132 ZZH RX MED GY IP 250 OP 250 PS 637: Performed by: PSYCHIATRY & NEUROLOGY

## 2019-09-07 RX ADMIN — TRAZODONE HYDROCHLORIDE 50 MG: 50 TABLET ORAL at 20:26

## 2019-09-07 RX ADMIN — LISINOPRIL 20 MG: 20 TABLET ORAL at 09:11

## 2019-09-07 RX ADMIN — ACETAMINOPHEN 1000 MG: 500 TABLET ORAL at 20:26

## 2019-09-07 RX ADMIN — DIAZEPAM 10 MG: 5 TABLET ORAL at 13:04

## 2019-09-07 RX ADMIN — HYDROXYZINE HYDROCHLORIDE 25 MG: 25 TABLET, FILM COATED ORAL at 09:11

## 2019-09-07 RX ADMIN — MULTIPLE VITAMINS W/ MINERALS TAB 1 TABLET: TAB at 09:11

## 2019-09-07 RX ADMIN — THIAMINE HCL TAB 100 MG 100 MG: 100 TAB at 09:11

## 2019-09-07 RX ADMIN — DIAZEPAM 10 MG: 5 TABLET ORAL at 20:54

## 2019-09-07 RX ADMIN — CITALOPRAM HYDROBROMIDE 10 MG: 10 TABLET ORAL at 09:11

## 2019-09-07 RX ADMIN — FOLIC ACID 1 MG: 1 TABLET ORAL at 09:11

## 2019-09-07 ASSESSMENT — ACTIVITIES OF DAILY LIVING (ADL)
LAUNDRY: WITH SUPERVISION
HYGIENE/GROOMING: INDEPENDENT
DRESS: INDEPENDENT
ORAL_HYGIENE: INDEPENDENT
ORAL_HYGIENE: INDEPENDENT
LAUNDRY: WITH SUPERVISION
DRESS: SCRUBS (BEHAVIORAL HEALTH)
HYGIENE/GROOMING: INDEPENDENT
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
## 2019-09-07 NOTE — PROGRESS NOTES
Full check-in wasn't able to be completed as patient was sleeping most of shift. Ivy did come out for dinner and a phone call, but otherwise spent the shift in her room. At the beginning of the evening shift she asked for medications due to feeling like her heart was racing. She was calm when present in the milieu.          09/06/19 5055   Behavioral Health   Hallucinations denies / not responding to hallucinations   Thinking other (see comment)  (PJ)   Orientation time: oriented;date: oriented;place: oriented;person: oriented   Memory other (see comment)  (PJ)   Insight other (see comment)  (PJ)   Judgement   (PJ)   Eye Contact at examiner   Affect blunted, flat   Mood mood is calm   Physical Appearance/Attire disheveled   Hygiene other (see comment);neglected grooming - unclean body, hair, teeth  (Stated she will shower in the AM)   Suicidality other (see comments)  (PJ)   1. Wish to be Dead (Past Month)   (PJ)   Wish to be Dead Description (Recent)   (PJ)   2. Non-Specific Active Suicidal Thoughts (Past Month)   (PJ)   Activities of Daily Living   Hygiene/Grooming independent   Oral Hygiene independent   Dress scrubs (behavioral health)   Laundry with supervision   Room Organization independent

## 2019-09-07 NOTE — PLAN OF CARE
Illness Management Recovery model:  Ways to [place].    Patient identified the following specific strategies to cope with their symptoms:    1. Will reach out to support network.  2. Will not be alone.  3. Will pray.

## 2019-09-07 NOTE — PLAN OF CARE
"\"At least I don't want to kill myself now.  I want to move forward and figure out how I am going to live my life.  I want to change from the person I am now, the drinking has caused me to not be a  good person.  I want to get that back again.\"  Denies wish to be dead, suicidal ideation or self harm thoughts.  Rates depression and anxiety levels at an 8 on a scale with 10 being the worst, states this has not changed.  Feels focus and concentration is poor, \"I keep thinking of everything over and over\".  Endorses racing and ruminating thoughts which are unchanged.  \"I just get overwhelmed when I think about all that I have to do.\"  States slept better last night.  When asked about plans to deal with drinking states, \"I want to stop.  I will do whatever they say I have to do.\"  Indicates is willing to go to CD treatment and then sober living after that.  \"I don't think my mom wants me to go back to my apartment and live alone.\"  States longest period of sobriety was 2 years, has mostly had 3 to 6 months at a time.  Was asked about what she will do differently indicates, \"I need to open up and really talk.\"  States has worked with therapists in the past, \"but then I just stop going.\"  Indicates has attended AA and worked with sponsors, plans to do this again.  Was up this morning for breakfast.  After eating returned to room to rest.  Not noted to interact with peers.  "

## 2019-09-08 PROCEDURE — H2032 ACTIVITY THERAPY, PER 15 MIN: HCPCS

## 2019-09-08 PROCEDURE — 25000132 ZZH RX MED GY IP 250 OP 250 PS 637: Performed by: PSYCHIATRY & NEUROLOGY

## 2019-09-08 PROCEDURE — 12400001 ZZH R&B MH UMMC

## 2019-09-08 PROCEDURE — 25000132 ZZH RX MED GY IP 250 OP 250 PS 637: Performed by: NURSE PRACTITIONER

## 2019-09-08 RX ADMIN — FOLIC ACID 1 MG: 1 TABLET ORAL at 08:36

## 2019-09-08 RX ADMIN — THIAMINE HCL TAB 100 MG 100 MG: 100 TAB at 08:36

## 2019-09-08 RX ADMIN — ACETAMINOPHEN 1000 MG: 500 TABLET ORAL at 20:34

## 2019-09-08 RX ADMIN — NICOTINE 1 PATCH: 14 PATCH, EXTENDED RELEASE TRANSDERMAL at 08:35

## 2019-09-08 RX ADMIN — LISINOPRIL 20 MG: 20 TABLET ORAL at 08:36

## 2019-09-08 RX ADMIN — TRAZODONE HYDROCHLORIDE 50 MG: 50 TABLET ORAL at 20:34

## 2019-09-08 RX ADMIN — MULTIPLE VITAMINS W/ MINERALS TAB 1 TABLET: TAB at 08:36

## 2019-09-08 RX ADMIN — CITALOPRAM HYDROBROMIDE 10 MG: 10 TABLET ORAL at 08:36

## 2019-09-08 ASSESSMENT — ACTIVITIES OF DAILY LIVING (ADL)
DRESS: INDEPENDENT
LAUNDRY: WITH SUPERVISION
ORAL_HYGIENE: INDEPENDENT
HYGIENE/GROOMING: INDEPENDENT

## 2019-09-08 NOTE — PROGRESS NOTES
"Pt displayed flat, sad affect. Pt spoke with writer, became tearful, reported, \"I just feel so guilty for what I did\", referred to drinking/attempt to self-harm. Pt reported, \"I'm just trying to process everything. I just want to get better\". Pt denies want to hurt self, or others.  Pt spent time in milieu, \"to help distract myself until I can better processy\". Denies SI/SIB/HI, denies AH/VH.       09/08/19 1400   Behavioral Health   Hallucinations denies / not responding to hallucinations   Thinking intact   Orientation person: oriented;time: oriented;date: oriented;place: oriented   Memory baseline memory   Insight insight appropriate to situation;insight appropriate to events   Judgement intact   Eye Contact at examiner   Affect sad  (tearful)   Mood depressed;shame/guilt   Physical Appearance/Attire appears stated age;attire appropriate to age and situation   Hygiene well groomed   Suicidality   (denies at this time)   1. Wish to be Dead (Past Month) No   Wish to be Dead Description (Recent)   (denies)   2. Non-Specific Active Suicidal Thoughts (Past Month) No   Non-Specific Active Suicidal Thought Description (Recent)   (denies)   3. Active Sucidal Ideation with any Methods (Not Plan) Without Intent to Act (Past Month) No   Active Suicidal Ideation with any Methods (Not Plan) Description (Past Month) No   4. Active Suicidal Ideation with Some Intent to Act, Without Specific Plan (Past Month) No     "

## 2019-09-08 NOTE — PROGRESS NOTES
"Ivy was present in the milieu more often this shift than yesterday. Her affect was flat, but would smile at this writer during our touchbase. Ivy was given a roommate just prior to our talk, and was upset about it due to the new roommate having all the lights on when she is trying to go to sleep. RN did try to have the lights turned off but have the bathroom light left on, but roommate arose and turned on all lights within 5 minutes. Ivy stated her goal today was to shower, which she did this morning. She rates her anxiety and depression as a 7/10, which is a little lower than her ratings from yesterday. She did attend the afternoon group today. She states she is \"tae sad about what my future holds and where I'll go\". When asked how she slept, she replied with \"yeah, okay\". She said she has been feeling lightheaded and a little groggy, but isn't sure if that is related to any med changes. Her mom, sister, and 2 daughters came to visit today and she stated \"it went well, but there were a lot of tears\". Pt denies SI/SIB.          09/07/19 2119   Behavioral Health   Hallucinations denies / not responding to hallucinations   Thinking intact   Orientation person: oriented;place: oriented;date: oriented;time: oriented   Memory baseline memory   Insight insight appropriate to situation;insight appropriate to events   Judgement intact   Eye Contact at examiner   Affect blunted, flat   Mood depressed;mood is calm   Physical Appearance/Attire appears stated age;attire appropriate to age and situation   Hygiene well groomed   Suicidality other (see comments)  (Pt denies)   1. Wish to be Dead (Past Month) No   Wish to be Dead Description (Recent)   (Pt denies)   2. Non-Specific Active Suicidal Thoughts (Past Month) No   Non-Specific Active Suicidal Thought Description (Recent)   (Pt denies)   Self Injury other (see comment)  (pt denies)   Elopement   (None observed)   Activity isolative;withdrawn   Speech " clear;coherent   Medication Sensitivity no stated side effects;no observed side effects   Psychomotor / Gait balanced;steady   Activities of Daily Living   Hygiene/Grooming independent   Oral Hygiene independent   Dress scrubs (behavioral health)   Laundry with supervision   Room Organization independent

## 2019-09-08 NOTE — PROGRESS NOTES
09/07/19 2200   General Information   Art Directive other (see comments)   AT directive was to draw an image of an addiction/depression or anxiety monster and to answer questions on handout related to monster's fears, strengths and weaknesses. Goals of directive: emotional expression, narrative therapy (to separate pt from the issues/allowing pt to externalize problems rather then internalize them).  Pt was a quiet participant, focused on task for the full duration of group. Pt created an image with oil pastels symbolizing her depression and addiction. Pt did not share details with group.  Pts mood was calm.

## 2019-09-08 NOTE — PROGRESS NOTES
" 09/08/19    Art Therapy   Type of Intervention structured groups   Response Participated with encouragement   Hours 1   Treatment Detail    Identity/ Affirmative art    Problem-1.  Major depressive disorder, recurrent, severe without psychotic features.   2.  Alcohol withdrawal with unspecified complications.   3.  Alcohol use disorder, moderate.   4.  History of gastric bypass, noncompliant with nutritional supplements.   5.  Leukocytosis.   6.  Hypertension.        Goal- process, express, cope and regulate feelings and emotions through Art Therapy directives.     Outcome- Pt was present and engaged in group. She made a torn paper piece with the word \" Hopeful.\" She talked about her piece, liking that it was irregular , imperfect and expanded the boundaries of the rectangle paper, but she siad she liked it. She liked that it didn't have to be perfect, it was busy and chaotic ,but she could still see that it said \"hopeful. .She took some paper scraps to experiment more with the torn paper process. She said she was glad she opened up a bit and didn't isolate and felt comfortable in the small group of three female pts and writer.            "

## 2019-09-09 PROCEDURE — 25000132 ZZH RX MED GY IP 250 OP 250 PS 637: Performed by: PSYCHIATRY & NEUROLOGY

## 2019-09-09 PROCEDURE — 99239 HOSP IP/OBS DSCHRG MGMT >30: CPT | Performed by: PSYCHIATRY & NEUROLOGY

## 2019-09-09 PROCEDURE — 12400001 ZZH R&B MH UMMC

## 2019-09-09 PROCEDURE — G0177 OPPS/PHP; TRAIN & EDUC SERV: HCPCS

## 2019-09-09 PROCEDURE — 25000132 ZZH RX MED GY IP 250 OP 250 PS 637: Performed by: NURSE PRACTITIONER

## 2019-09-09 RX ORDER — LANOLIN ALCOHOL/MO/W.PET/CERES
6 CREAM (GRAM) TOPICAL
Status: DISCONTINUED | OUTPATIENT
Start: 2019-09-09 | End: 2019-09-10 | Stop reason: HOSPADM

## 2019-09-09 RX ORDER — TRAZODONE HYDROCHLORIDE 50 MG/1
50 TABLET, FILM COATED ORAL
Qty: 30 TABLET | Refills: 1 | Status: SHIPPED | OUTPATIENT
Start: 2019-09-09 | End: 2020-02-26

## 2019-09-09 RX ORDER — LANOLIN ALCOHOL/MO/W.PET/CERES
6 CREAM (GRAM) TOPICAL AT BEDTIME
Status: DISCONTINUED | OUTPATIENT
Start: 2019-09-09 | End: 2019-09-10 | Stop reason: HOSPADM

## 2019-09-09 RX ORDER — LANOLIN ALCOHOL/MO/W.PET/CERES
3 CREAM (GRAM) TOPICAL
Status: DISCONTINUED | OUTPATIENT
Start: 2019-09-09 | End: 2019-09-09

## 2019-09-09 RX ORDER — CITALOPRAM HYDROBROMIDE 20 MG/1
TABLET ORAL
Qty: 30 TABLET | Refills: 1 | Status: SHIPPED | OUTPATIENT
Start: 2019-09-10 | End: 2020-02-26

## 2019-09-09 RX ADMIN — CITALOPRAM HYDROBROMIDE 10 MG: 10 TABLET ORAL at 08:34

## 2019-09-09 RX ADMIN — MULTIPLE VITAMINS W/ MINERALS TAB 1 TABLET: TAB at 08:34

## 2019-09-09 RX ADMIN — LISINOPRIL 20 MG: 20 TABLET ORAL at 08:34

## 2019-09-09 RX ADMIN — FOLIC ACID 1 MG: 1 TABLET ORAL at 08:34

## 2019-09-09 RX ADMIN — THIAMINE HCL TAB 100 MG 100 MG: 100 TAB at 08:34

## 2019-09-09 RX ADMIN — MELATONIN TAB 3 MG 6 MG: 3 TAB at 21:15

## 2019-09-09 ASSESSMENT — ACTIVITIES OF DAILY LIVING (ADL)
ORAL_HYGIENE: INDEPENDENT
DRESS: INDEPENDENT
LAUNDRY: WITH SUPERVISION
HYGIENE/GROOMING: INDEPENDENT
LAUNDRY: WITH SUPERVISION
DRESS: INDEPENDENT
ORAL_HYGIENE: INDEPENDENT
HYGIENE/GROOMING: INDEPENDENT

## 2019-09-09 NOTE — DISCHARGE INSTRUCTIONS
Behavioral Discharge Planning and Instructions      Summary:  You were admitted on 9/6/2019  due to Suicidal Ideations, Suicide Attempt and Chemical Use Issues.  You were treated by Dr. Kayden Couch MD and discharged on 09/10/19 from [facility] to Home      Principal Diagnosis:       Health Care Follow-up Appointments:   As discussed with your treatment team, it was recommended that you follow up with [facility] ([address] [place] [address]) to get a Rule 25 done in order to start CD treatment. Additional aftercare services and resources can also be provided for you there, and you are encouraged to talk with them about this, as it is on a walk-in-basis and appointments can not be set up by your [facility]. The information for the [facility] Stabilization [facility] ([address] [place] [address]) is listed below.     [facility]  [facility] Stabil[facility] Program helps people who have an urgent concern related to their mental health or substance use disorder connect to case management to help them get the support and services they need. Care coordinators, peer recovery specialists, and health professionals address clients' physical health, mental health, addiction issues including housing, health insurance, general financial assistance, Rule 25 assessments and other resources.  Services are available on a walk-in basis.  Hours:10:00 am - 5:00 pm  Phone Number: [phone]  Location: [address] [place] [address], room [address]   Attend all scheduled appointments with your outpatient providers. Call at least 24 hours in advance if you need to reschedule an appointment to ensure continued access to your outpatient providers.   Major Treatments, Procedures and Findings:  You were provided with: a psychiatric assessment, assessed for medical stability, medication evaluation and/or management, group therapy, milieu management and medical interventions    Symptoms to Report: losing more sleep, mood  getting worse, thoughts of suicide or urge to binge drink    Early warning signs can include: increased depression or anxiety sleep disturbances increased thoughts or behaviors of suicide or self-harm or urge to binge drink.    Safety and Wellness:  Take all medicines as directed.  Make no changes unless your doctor suggests them.      Follow treatment recommendations.  Refrain from alcohol and non-prescribed drugs.  If there is a concern for safety, call 911.    Resources:   Crisis Intervention: 402.436.9598 or 071-173-0550 (TTY: 647.420.4097).  Call anytime for help.  National [place] on Mental Illness (www.[place].zachery.org): 317.450.4483 or 633-365-9196.  Alcoholics Anonymous (www.alcoholics-anonymous.org): Check your phone book for your local chapter.  Suicide Awareness Voices of Education (SAVE) (www.save.org): 868-236-TMQU (3483)  National Suicide Prevention Line (www.mentalhealthmn.org): 590-802-AHKD (0183)  Mental Health Consumer/Survivor Network of [place] (www.mhcsn.net): 959.231.8208 or 434-373-5360  Mental Health Association of [place] (www.mentalhealth.org): 976.104.9752 or 234-316-2755  Self- Management and Recovery Training., SMART-- Toll free: 957.841.3080  www.Dataslide.org  [facility] Crisis (COPE) Response - Adult 263 867-2126  [facility] Crisis Response - Adult [phone]      The treatment team has appreciated the opportunity to work with you.     If you have any questions or concerns our unit number is [phone].

## 2019-09-09 NOTE — PLAN OF CARE
"  Problem: Adult Behavioral Health Plan of Care  Goal: Plan of Care Review  Outcome: Improving     48 Hour Nursing Observation  9/9/2019   PTSD, Depression, Anxiety  Suicidal behavior with attempted self-injury (H)     Patient was visible in the milieu. She was visited by her . RN had 1:1 interview with her today.  Patient's stated that her goal is to \"stay input at the moment and project her feelings.\" His next step is going home. Verbalized that she's scared but it she was hopeful. That she has her sister and mother as a support and will accompany her to her treatments. She rated her anxiety 5/10 and depression 4/10. Denied SI, SIB, HI and AH. She stated that she feels, \"groggy and foggy,\" when she takes Trazodone. She preferred to take Melatonin at bed time. Placed a call to on-call MD, Dr. Gonzalez. Obtained orders for Melatonin. She denied pain. Stated he appetite was \"good\" and sleep was \"ok.\"          "

## 2019-09-09 NOTE — PROGRESS NOTES
Pt is officially out of detox at 2100 9/8/19. Pt went 24 hours without needing medication for withdrawal symptoms. MSSA and withdrawal precautions have been discontinued.

## 2019-09-09 NOTE — PROGRESS NOTES
SPIRITUAL HEALTH SERVICES  [facility] [facility]   ON-CALL VISIT    REFERRAL SOURCE: .patient/family request at admission for chaplaincy support    Pt declined visit, stating that she had been visited by her  just an hour ago and was feeling good.     PLAN: No further [facility] needs expressed at this time. No plan to follow up unless requested by pt, who is aware how to request future suport.      Vi Ng MDiv, [facility]  Staff   Pager [phone]

## 2019-09-09 NOTE — DISCHARGE SUMMARY
Psychiatric Discharge Summary    Ivy Teague MRN# [id number]   Age: 44 year old YOB: 1975     Date of Admission:  9/6/2019  Date of Discharge:  9/10/2019  Admitting Physician:  Kayden Couch MD  Discharge Physician:  Kayden Couch MD       Event Leading to Hospitalization:   The patient is a 44-year-old woman who presents with increasing depression, suicidal thoughts and also symptoms of alcohol withdrawal.  The patient reports that she has been binge drinking.  She will go several weeks without drinking at all and then drinking heavily for a week straight.  She reports that over the last binge, she was drinking almost a bottle daily.  She does have a history of this, has been in treatment in the past.  Currently, she would like to get into treatment.  She also would like to get back on medications.  She reports that she took the Celexa in the past and felt that during that time she was feeling good and was able to stay off of the alcohol, so she feels that is a reasonably place to restart.  She reports problems with sleep, problems with appetite, and problems with concentration.  She endorses anhedonia.  She has been sitting in her house a lot.  In fact, she was not responding to attempts at communication and so her mom went to do a welfare check and found her door locked and she was in her room.  The patient actually engaged in some cutting on her wrist and stabbed something into her abdomen.  She believes she may have been trying to kill herself and just trying to build up the nerve.  She has never engaged in any sort of self-harm in the past.  She is quite frightened and really wanted to get help right now.        See Admission note by Kayden Couch MD on 9/6/2019 for additional details.          Diagnoses:     1.  Major depressive disorder, recurrent, severe without psychotic features.   2.  Alcohol withdrawal with unspecified complications - resovled  3.  Alcohol use  disorder, moderate to severe.  4.  History of gastric bypass, noncompliant with nutritional supplements.   5.  Leukocytosis -resolved on recheck  6.  Hypertension.          Labs:     Recent Results (from the past 168 hour(s))   Alcohol level blood    Collection Time: 09/05/19  1:12 PM   Result Value Ref Range    Ethanol g/dL 0.05 (H) <0.01 g/dL   CBC with platelets differential    Collection Time: 09/05/19  1:16 PM   Result Value Ref Range    WBC 16.8 (H) 4.0 - 11.0 10e9/L    RBC Count 4.79 3.8 - 5.2 10e12/L    Hemoglobin 12.5 11.7 - 15.7 g/dL    Hematocrit 38.7 35.0 - 47.0 %    MCV 81 78 - 100 fl    MCH 26.1 (L) 26.5 - 33.0 pg    MCHC 32.3 31.5 - 36.5 g/dL    RDW 28.4 (H) 10.0 - 15.0 %    Platelet Count 550 (H) 150 - 450 10e9/L    Diff Method Automated Method     % Neutrophils 86.4 %    % Lymphocytes 7.7 %    % Monocytes 4.7 %    % Eosinophils 0.0 %    % Basophils 0.8 %    % Immature Granulocytes 0.4 %    Nucleated RBCs 0 0 /100    Absolute Neutrophil 14.5 (H) 1.6 - 8.3 10e9/L    Absolute Lymphocytes 1.3 0.8 - 5.3 10e9/L    Absolute Monocytes 0.8 0.0 - 1.3 10e9/L    Absolute Basophils 0.1 0.0 - 0.2 10e9/L    Abs Immature Granulocytes 0.1 0 - 0.4 10e9/L    Absolute Nucleated RBC 0.0    Comprehensive metabolic panel    Collection Time: 09/05/19  1:16 PM   Result Value Ref Range    Sodium 136 133 - 144 mmol/L    Potassium 4.4 3.4 - 5.3 mmol/L    Chloride 102 94 - 109 mmol/L    Carbon Dioxide 15 (L) 20 - 32 mmol/L    Anion Gap 19 (H) 3 - 14 mmol/L    Glucose 70 70 - 99 mg/dL    Urea Nitrogen 21 7 - 30 mg/dL    Creatinine 0.49 (L) 0.52 - 1.04 mg/dL    GFR Estimate >90 >60 mL/min/[1.73_m2]    GFR Estimate If Black >90 >60 mL/min/[1.73_m2]    Calcium 8.4 (L) 8.5 - 10.1 mg/dL    Bilirubin Total 1.3 0.2 - 1.3 mg/dL    Albumin 3.6 3.4 - 5.0 g/dL    Protein Total 7.6 6.8 - 8.8 g/dL    Alkaline Phosphatase 53 40 - 150 U/L    ALT 50 0 - 50 U/L     (H) 0 - 45 U/L   INR    Collection Time: 09/05/19  1:16 PM   Result Value  Ref Range    INR 1.02 0.86 - 1.14   Partial thromboplastin time    Collection Time: 09/05/19  1:16 PM   Result Value Ref Range    PTT 24 22 - 37 sec   Acetaminophen level    Collection Time: 09/05/19  1:16 PM   Result Value Ref Range    Acetaminophen Level <2 mg/L   Salicylate level    Collection Time: 09/05/19  1:16 PM   Result Value Ref Range    Salicylate Level 6 mg/dL   Lactic acid whole blood    Collection Time: 09/05/19  2:12 PM   Result Value Ref Range    Lactic Acid 5.8 (HH) 0.7 - 2.0 mmol/L   TSH with free T4 reflex    Collection Time: 09/07/19  8:04 AM   Result Value Ref Range    TSH 2.00 0.40 - 4.00 mU/L   Vitamin B12    Collection Time: 09/07/19  8:04 AM   Result Value Ref Range    Vitamin B12 586 193 - 986 pg/mL   Folate    Collection Time: 09/07/19  8:04 AM   Result Value Ref Range    Folate 9.9 >5.4 ng/mL   CBC with platelets differential    Collection Time: 09/07/19  8:04 AM   Result Value Ref Range    WBC 6.1 4.0 - 11.0 10e9/L    RBC Count 4.59 3.8 - 5.2 10e12/L    Hemoglobin 11.9 11.7 - 15.7 g/dL    Hematocrit 37.6 35.0 - 47.0 %    MCV 82 78 - 100 fl    MCH 25.9 (L) 26.5 - 33.0 pg    MCHC 31.6 31.5 - 36.5 g/dL    RDW 26.2 (H) 10.0 - 15.0 %    Platelet Count 355 150 - 450 10e9/L    Diff Method Automated Method     % Neutrophils 70.7 %    % Lymphocytes 21.8 %    % Monocytes 5.3 %    % Eosinophils 1.5 %    % Basophils 0.5 %    % Immature Granulocytes 0.2 %    Nucleated RBCs 0 0 /100    Absolute Neutrophil 4.3 1.6 - 8.3 10e9/L    Absolute Lymphocytes 1.3 0.8 - 5.3 10e9/L    Absolute Monocytes 0.3 0.0 - 1.3 10e9/L    Absolute Eosinophils 0.1 0.0 - 0.7 10e9/L    Absolute Basophils 0.0 0.0 - 0.2 10e9/L    Abs Immature Granulocytes 0.0 0 - 0.4 10e9/L    Absolute Nucleated RBC 0.0                 Consults:   No consultations were requested during this admission         Hospital Course:   Ivy Teague was admitted to [facility] with attending Kayden Couch MD as a voluntary patient. The patient  was placed under status 15 (15 minute checks) to ensure patient safety.   MSSA protocol was initiated due to the patient's history of alcohol abuse and concern for withdrawal symptoms.    Patient initially was quite depressed and expressing desire to get into CD treatment. She was started on citalopram 10 mg and trazodone for sleep. She did well with this and reported to feeling better after she completed here detox. On 9/9 I did not feel that further inpatient psychiatric care was needed, but did support patient in belief that inpatient CD treatment would likely be helpful. The patient expressed that she will be moving out of her apartment and will have support from family. She believes that they can help her keep sober until she gets into treatment. The plan was made to discharge her in the AM to [facility] for a rule 25 and also other referrals for treatment as indicated. She was going to have family meet her there for the assessment. Later she told CTC that she may not go directly to [facility] and instead will have family pick her up here. She was advised to seek treatment as soon as possible as relapse would likely lead to increase in depression and significantly increased risk of suicide. Patient understood these recommendations and plans to follow through as she best sees fit.    Ivy Teague was released to home. At the time of discharge Ivy Teague was determined to not be a danger to herself or others.          Discharge Medications:     Current Discharge Medication List      START taking these medications    Details   citalopram (CELEXA) 20 MG tablet Take 0.5 tablets (10 mg) by mouth daily for 6 days, THEN 1 tablet (20 mg) daily for 24 days.  Qty: 30 tablet, Refills: 1    Associated Diagnoses: Moderate recurrent major depression (H)      traZODone (DESYREL) 50 MG tablet Take 1 tablet (50 mg) by mouth nightly as needed for sleep  Qty: 30 tablet, Refills: 1    Associated Diagnoses:  Moderate recurrent major depression (H)         CONTINUE these medications which have NOT CHANGED    Details   acetaminophen (TYLENOL) 500 MG tablet Take 1,000 mg by mouth every 6 hours as needed       lisinopril (PRINIVIL/ZESTRIL) 10 MG tablet Take 2 tablets (20 mg) by mouth daily  Qty: 90 tablet, Refills: 0    Associated Diagnoses: Hypertension goal BP (blood pressure) < 140/90                  Psychiatric Examination:   Appearance:  awake, alert, adequately groomed and dressed in hospital scrubs  Attitude:  cooperative  Eye Contact:  good  Mood:  better  Affect:  appropriate and in normal range and mood congruent  Speech:  clear, coherent  Psychomotor Behavior:  no evidence of tardive dyskinesia, dystonia, or tics  Thought Process:  logical, linear and goal oriented  Associations:  no loose associations  Thought Content:  no evidence of suicidal ideation or homicidal ideation and no evidence of psychotic thought  Insight:  good  Judgment:  intact  Oriented to:  time, person, and place  Attention Span and Concentration:  intact  Recent and Remote Memory:  intact  Language: Able to name objects, Able to repeat phrases and Able to read and write  Fund of Knowledge: appropriate  Muscle Strength and Tone: normal  Gait and Station: Normal         Discharge Plan:   The patient was discharged to [address] [place] [address] for walk-in intake on first come first serve basis. There she will have a rule 25 and be provided resources for outpatient psychiatry.   The patient plans to receive help from mother and sister as she is losing her apartment. Her goals is to get into a residential CD treatment. She will use AA and sober supports in the short-term until she gets into a treatment facility.      Attestation:  The patient has been seen and evaluated by me,  Kayden Couch MD   On 9/9/2019, I saw the patient and performed the above examination, reviewed discharge medications, reviewed follow-up plan, and assessed safety for  discharge. I spent greater than 30 minutes on these tasks.

## 2019-09-09 NOTE — PROGRESS NOTES
"Pt stated her goal for the day was to \"take a shower\" pt was able to achieve this goal. Pt was visible in the milieu and observed socializing w/peers throughout evening shift. Upon check in pt rated depression and anxiety 6/10. Pt reported feeling anxious due to uncertainty about her future. Pt denies psychotic symptoms. Pt denies SI and SIB. Pt's sister and brother in law visited during visitation, this lifted pt's mood. Pt stated \"I know I need to be here right now, and I'm very thankful for my family's support.\" Pt appeared hopeful for the future. Pt was calm and cooperative w/staff. Pt had an uneventful shift.        09/08/19 2100   Behavioral Health   Hallucinations denies / not responding to hallucinations   Thinking distractable   Orientation person: oriented;place: oriented;date: oriented;time: oriented   Memory baseline memory   Insight insight appropriate to situation;insight appropriate to events   Judgement impaired   Eye Contact at examiner   Affect blunted, flat   Mood depressed;anxious   Physical Appearance/Attire attire appropriate to age and situation   Hygiene well groomed   Suicidality   (Denies SI)   1. Wish to be Dead (Past Month) No   2. Non-Specific Active Suicidal Thoughts (Past Month) No   Self Injury   (Denies SIB)   Elopement   (No elopment concerns)   Activity   (visible in milieu, social w/peers)   Speech coherent;clear   Medication Sensitivity no stated side effects;no observed side effects   Psychomotor / Gait steady;balanced   Activities of Daily Living   Hygiene/Grooming independent   Oral Hygiene independent   Dress independent   Laundry with supervision   Room Organization independent      "

## 2019-09-09 NOTE — PROGRESS NOTES
GRAYSON (writer) met with pt to discuss her upcoming discharge. Pt expressed anxiety over going [facility] by herself, and assumed she would need to go via her sister or other family member. Pt was unsure if her family would be able to pick her up in the morning. GRAYSON explained the process of taking a taxi directly from the hospital to [facility], for [id number] services, but pt expressed resistance to this idea due to her anxiety and wanting support from her family in this process. GRAYSON explained the importance of going directly to [facility] to get her Rule 25 done and how delaying this process can delay getting into treatment and increase the chance of recidivism (GRAYSON did not use this term). Pt expressed understanding, but did not appear convinced. Pt is still planning to discharge tomorrow but was uncertain about the time, as she insists on utilizing her family. Pt confirmed that she has already reached out to family for a ride and is waiting to hear back. Pt agreed to inform staff when she knew what time they would be here.

## 2019-09-09 NOTE — PLAN OF CARE
"48 Hour Assessment:    Pt affect full range and pt reports feeling a little sad, but  not depressed today. Pt attending and participating in unit groups/activities.  Pt appropriate and social with staff and peers.  Pt denies SI/Self harm thoughts, urges, plan, and intent. Pt denies wanting to be dead. Pt expressed that she feels anxious for \"what's next\" but seemed hopeful that she will be able to make positive changes in the near future. Pt mentioned that she is interested in pursuing dual treatment after discharge tomorrow (see CTC note).  Pt denies physical discomfort.  Pt reported that she has felt a little bit cloudy the last few days and has attributed that to taking trazodone to help with sleep. Pt requested melatonin instead as that is what she takes at home. Provider notified.  Pt denies AVH.  Pt eating and drinking without issue. Will continue to assess and provide support as appropriate.      SI/Self harm: denies    Hi: denies    AVH: denies    Sleep: sleeps well with PRN- would like melatonin tonight    Medication Ae: mind feeling cloudy- attributed to trazodone    Pain: none    Vitals:  VSS          "

## 2019-09-10 VITALS
HEART RATE: 111 BPM | SYSTOLIC BLOOD PRESSURE: 122 MMHG | BODY MASS INDEX: 22.98 KG/M2 | RESPIRATION RATE: 16 BRPM | TEMPERATURE: 97.6 F | OXYGEN SATURATION: 100 % | DIASTOLIC BLOOD PRESSURE: 84 MMHG | WEIGHT: 127.7 LBS

## 2019-09-10 PROCEDURE — 25000132 ZZH RX MED GY IP 250 OP 250 PS 637: Performed by: PSYCHIATRY & NEUROLOGY

## 2019-09-10 PROCEDURE — 25000132 ZZH RX MED GY IP 250 OP 250 PS 637: Performed by: NURSE PRACTITIONER

## 2019-09-10 RX ADMIN — CITALOPRAM HYDROBROMIDE 10 MG: 10 TABLET ORAL at 08:38

## 2019-09-10 RX ADMIN — FOLIC ACID 1 MG: 1 TABLET ORAL at 08:38

## 2019-09-10 RX ADMIN — MULTIPLE VITAMINS W/ MINERALS TAB 1 TABLET: TAB at 08:39

## 2019-09-10 RX ADMIN — LISINOPRIL 20 MG: 20 TABLET ORAL at 08:38

## 2019-09-10 RX ADMIN — THIAMINE HCL TAB 100 MG 100 MG: 100 TAB at 08:39

## 2019-09-10 ASSESSMENT — ACTIVITIES OF DAILY LIVING (ADL)
DRESS: INDEPENDENT
ORAL_HYGIENE: INDEPENDENT
HYGIENE/GROOMING: INDEPENDENT
LAUNDRY: WITH SUPERVISION

## 2019-09-10 NOTE — PLAN OF CARE
"\"I'm feeling more stable about leaving.  I won't be alone as I am staying with my sister or my mother until I get into CD treatment.  I'm going to move forward.\"  Indicates depression is less, rating this at a 4 on a scale with 10 being the worst.  Anxiety is also less, rating this at a 3 on the same scale.  Denies suicidal ideation, wish to be dead or self harm thoughts.  \"I want to live.\"  Indicates focus and concentration are improved, \"it's coming back\".  Denies any further racing or ruminating thoughts.  States sleep is good just taking Melatonin.  \"I can't take Trazodone.  It made me so groggy the next day I can't think straight.  I feel so much better having not taken it last night.\"  Indicates has a CD assessment scheduled in [place] at 1300 today.  \"I've been through treatment before, but I haven't dealt with both the mental illness and the chemical dependency.  I want to do that this time.\"  States feels confident will be able to maintain sobriety until able to get into a program as there is no alcohol at mother or sister's house and they are both supportive.  Plans to attend AA groups and contact sponsor later today.  Has been visible in milieu, social with peers and preparing for discharge.  "

## 2019-09-10 NOTE — PROGRESS NOTES
"   09/10/19 1027   General Information   Date Initially Attended OT 09/09/19     Groups Attended: OT Clinic, Leisure    Affect/Hygiene/Presentation: Calm/pleasant mood, engaged, congruent affect. No apparent hygiene concerns.     Patient Performance/Response:  -Clinic: Pt actively participated in occupational therapy clinic. Pt was able to ask for assistance as needed, and independently initiate a familiar, creative expression ask after initial orientation to clinic materials was provided by writer. Pt demonstrated good focus, planning, and attention to detail during task completion. Pt appeared comfortable interacting with peers and writer, however generally kept to herself.   -Leisure: Pt actively participated in a structured occupational therapy group with a focus on a visual-spatial leisure task. Pt was able to follow 2-step directions of the novel task, and demonstrated strategic planning and problem solving throughout task. Pt remained focused for full duration of group. Pt contributed ideas to a discussion regarding the importance of engaging in leisure activities for mental health and overall wellbeing. Pt identified during discussion that she needs to incorporate more \"self-care time\" into her weekly routine upon discharge to better manage her mental health.     Plan: More information needed to complete OT Initial Assessment; OT staff will meet with pt to review the role of occupational therapy and explain the value of having them involved in their treatment plan including options to meet current needs/self-identified goals. As group attendance is established, Pt will be given self-assessment to inform OT initial assessment.       "

## 2019-09-11 ENCOUNTER — TELEPHONE (OUTPATIENT)
Dept: FAMILY MEDICINE | Facility: CLINIC | Age: 44
End: 2019-09-11

## 2019-09-11 NOTE — TELEPHONE ENCOUNTER
Patient called to schedule an appointment for a hospital follow-up or appeared on a report showing that they were recently discharged from the hospital.    Patient was admitted to :  [facility]  Discharged date: 9/10/19  Reason for hospital admission:  Hypertension Goal Bp (Blood Pressure) < 140/90, Moderate Recurrent Major Depression (H)  Does patient have future appointment scheduled with provider? No  Date of future appointment:        This information will be used to help the care team plan for the patients upcoming visit.  The triage RN may determine that a follow up call is necessary and reach out to the patient via the phone number listed in the chart.     Please route this message on routine priority to the Triage RN pool.

## 2019-09-11 NOTE — TELEPHONE ENCOUNTER
ED / Discharge Outreach Protocol    Patient Contact    Attempt # 1    Was call answered?  No.  Left message on voicemail with information to call clinic back when able.     Ruthann Mcconnell RN

## 2019-09-13 NOTE — TELEPHONE ENCOUNTER
ED / Discharge Outreach Protocol    Patient Contact    Attempt # 2    Was call answered?  No.  Left message on voicemail with information to call clinic back.    Ruthann Mcconnell RN

## 2019-09-16 NOTE — TELEPHONE ENCOUNTER
ED / Discharge Outreach Protocol    Patient Contact    Attempt # 3    Was call answered?  No.  Left message on voicemail with information to call  Back and speak with any of the RNs. [phone]........VINNIE Lopez

## 2019-09-27 DIAGNOSIS — I10 HYPERTENSION GOAL BP (BLOOD PRESSURE) < 140/90: ICD-10-CM

## 2019-09-27 NOTE — TELEPHONE ENCOUNTER
"Routing refill request to provider for review/approval because:  Labs out of range:  CR  T'd up 1 month for provider review.    Requested Prescriptions   Pending Prescriptions Disp Refills     lisinopril (PRINIVIL/ZESTRIL) 10 MG tablet 90 tablet 0     Sig: Take 2 tablets (20 mg) by mouth daily   Last Written Prescription Date:  7/23/2019  Last Fill Quantity: 90,  # refills: 0   Last office visit: 8/8/2019 with prescribing provider:     Future Office Visit:        ACE Inhibitors (Including Combos) Protocol Failed - 9/27/2019 10:15 AM        Failed - Normal serum creatinine on file in past 12 months     Recent Labs   Lab Test 09/05/19  1316   CR 0.49*           Passed - Blood pressure under 140/90 in past 12 months     BP Readings from Last 3 Encounters:   09/08/19 122/84   09/06/19 (!) 160/91   08/26/19 (!) 149/86           Passed - Recent (12 mo) or future (30 days) visit within the authorizing provider's specialty     Patient has had an office visit with the authorizing provider or a provider within the authorizing providers department within the previous 12 mos or has a future within next 30 days. See \"Patient Info\" tab in inbasket, or \"Choose Columns\" in Meds & Orders section of the refill encounter.            Passed - Medication is active on med list        Passed - Patient is age 18 or older        Passed - No active pregnancy on record        Passed - Normal serum potassium on file in past 12 months     Recent Labs   Lab Test 09/05/19  1316   POTASSIUM 4.4           Passed - No positive pregnancy test within past 12 months      Natalie Dutton RN      "

## 2019-09-28 ENCOUNTER — HEALTH MAINTENANCE LETTER (OUTPATIENT)
Age: 44
End: 2019-09-28

## 2019-10-02 RX ORDER — LISINOPRIL 10 MG/1
20 TABLET ORAL DAILY
Qty: 60 TABLET | Refills: 0 | Status: SHIPPED | OUTPATIENT
Start: 2019-10-02 | End: 2020-03-13

## 2020-02-26 ENCOUNTER — OFFICE VISIT (OUTPATIENT)
Dept: PEDIATRICS | Facility: CLINIC | Age: 45
End: 2020-02-26
Payer: COMMERCIAL

## 2020-02-26 VITALS
WEIGHT: 141.5 LBS | HEIGHT: 63 IN | OXYGEN SATURATION: 98 % | HEART RATE: 86 BPM | BODY MASS INDEX: 25.07 KG/M2 | TEMPERATURE: 98.1 F | DIASTOLIC BLOOD PRESSURE: 80 MMHG | SYSTOLIC BLOOD PRESSURE: 118 MMHG

## 2020-02-26 DIAGNOSIS — R92.8 ABNORMAL MAMMOGRAM: ICD-10-CM

## 2020-02-26 DIAGNOSIS — E53.9 B-COMPLEX DEFICIENCY: ICD-10-CM

## 2020-02-26 DIAGNOSIS — I10 BENIGN ESSENTIAL HYPERTENSION: ICD-10-CM

## 2020-02-26 DIAGNOSIS — F10.21 ALCOHOL DEPENDENCE IN REMISSION (H): ICD-10-CM

## 2020-02-26 DIAGNOSIS — E53.9 VITAMIN B-COMPLEX DEFICIENCY: ICD-10-CM

## 2020-02-26 DIAGNOSIS — T14.91XA SUICIDAL BEHAVIOR WITH ATTEMPTED SELF-INJURY (H): ICD-10-CM

## 2020-02-26 DIAGNOSIS — Z98.84 S/P GASTRIC BYPASS: Primary | ICD-10-CM

## 2020-02-26 DIAGNOSIS — F41.1 GAD (GENERALIZED ANXIETY DISORDER): ICD-10-CM

## 2020-02-26 DIAGNOSIS — D50.8 IRON DEFICIENCY ANEMIA SECONDARY TO INADEQUATE DIETARY IRON INTAKE: ICD-10-CM

## 2020-02-26 DIAGNOSIS — F43.10 POSTTRAUMATIC STRESS DISORDER: ICD-10-CM

## 2020-02-26 DIAGNOSIS — E53.8 VITAMIN B12 DEFICIENCY (NON ANEMIC): ICD-10-CM

## 2020-02-26 PROBLEM — R73.01 ELEVATED FASTING BLOOD SUGAR: Status: RESOLVED | Noted: 2019-07-01 | Resolved: 2020-02-26

## 2020-02-26 PROBLEM — Z30.430 ENCOUNTER FOR INSERTION OF MIRENA IUD: Status: ACTIVE | Noted: 2020-01-29

## 2020-02-26 LAB
ALBUMIN SERPL-MCNC: 4.1 G/DL (ref 3.4–5)
ALP SERPL-CCNC: 51 U/L (ref 40–150)
ALT SERPL W P-5'-P-CCNC: 19 U/L (ref 0–50)
ANION GAP SERPL CALCULATED.3IONS-SCNC: 4 MMOL/L (ref 3–14)
AST SERPL W P-5'-P-CCNC: 18 U/L (ref 0–45)
BASOPHILS # BLD AUTO: 0.1 10E9/L (ref 0–0.2)
BASOPHILS NFR BLD AUTO: 1.2 %
BILIRUB SERPL-MCNC: 0.5 MG/DL (ref 0.2–1.3)
BUN SERPL-MCNC: 16 MG/DL (ref 7–30)
CALCIUM SERPL-MCNC: 9.1 MG/DL (ref 8.5–10.1)
CHLORIDE SERPL-SCNC: 104 MMOL/L (ref 94–109)
CO2 SERPL-SCNC: 25 MMOL/L (ref 20–32)
CREAT SERPL-MCNC: 0.83 MG/DL (ref 0.52–1.04)
DIFFERENTIAL METHOD BLD: NORMAL
EOSINOPHIL # BLD AUTO: 0.3 10E9/L (ref 0–0.7)
EOSINOPHIL NFR BLD AUTO: 3.8 %
ERYTHROCYTE [DISTWIDTH] IN BLOOD BY AUTOMATED COUNT: 12.9 % (ref 10–15)
GFR SERPL CREATININE-BSD FRML MDRD: 84 ML/MIN/{1.73_M2}
GLUCOSE SERPL-MCNC: 80 MG/DL (ref 70–99)
HCT VFR BLD AUTO: 40.4 % (ref 35–47)
HGB BLD-MCNC: 13.5 G/DL (ref 11.7–15.7)
IMM GRANULOCYTES # BLD: 0 10E9/L (ref 0–0.4)
IMM GRANULOCYTES NFR BLD: 0.2 %
IRON SATN MFR SERPL: 28 % (ref 15–46)
IRON SERPL-MCNC: 93 UG/DL (ref 35–180)
LYMPHOCYTES # BLD AUTO: 2.1 10E9/L (ref 0.8–5.3)
LYMPHOCYTES NFR BLD AUTO: 24.6 %
MCH RBC QN AUTO: 29.6 PG (ref 26.5–33)
MCHC RBC AUTO-ENTMCNC: 33.4 G/DL (ref 31.5–36.5)
MCV RBC AUTO: 89 FL (ref 78–100)
MONOCYTES # BLD AUTO: 0.5 10E9/L (ref 0–1.3)
MONOCYTES NFR BLD AUTO: 6.2 %
NEUTROPHILS # BLD AUTO: 5.5 10E9/L (ref 1.6–8.3)
NEUTROPHILS NFR BLD AUTO: 64 %
PLATELET # BLD AUTO: 446 10E9/L (ref 150–450)
POTASSIUM SERPL-SCNC: 4.5 MMOL/L (ref 3.4–5.3)
PROT SERPL-MCNC: 8 G/DL (ref 6.8–8.8)
RBC # BLD AUTO: 4.56 10E12/L (ref 3.8–5.2)
SODIUM SERPL-SCNC: 133 MMOL/L (ref 133–144)
TIBC SERPL-MCNC: 329 UG/DL (ref 240–430)
VIT B12 SERPL-MCNC: 402 PG/ML (ref 193–986)
WBC # BLD AUTO: 8.6 10E9/L (ref 4–11)

## 2020-02-26 PROCEDURE — 82607 VITAMIN B-12: CPT | Performed by: FAMILY MEDICINE

## 2020-02-26 PROCEDURE — 80053 COMPREHEN METABOLIC PANEL: CPT | Performed by: FAMILY MEDICINE

## 2020-02-26 PROCEDURE — 83540 ASSAY OF IRON: CPT | Performed by: FAMILY MEDICINE

## 2020-02-26 PROCEDURE — 82306 VITAMIN D 25 HYDROXY: CPT | Performed by: FAMILY MEDICINE

## 2020-02-26 PROCEDURE — 82261 ASSAY OF BIOTINIDASE: CPT | Mod: 90 | Performed by: FAMILY MEDICINE

## 2020-02-26 PROCEDURE — 85025 COMPLETE CBC W/AUTO DIFF WBC: CPT | Performed by: FAMILY MEDICINE

## 2020-02-26 PROCEDURE — 83550 IRON BINDING TEST: CPT | Performed by: FAMILY MEDICINE

## 2020-02-26 PROCEDURE — 99214 OFFICE O/P EST MOD 30 MIN: CPT | Performed by: FAMILY MEDICINE

## 2020-02-26 PROCEDURE — 36415 COLL VENOUS BLD VENIPUNCTURE: CPT | Performed by: FAMILY MEDICINE

## 2020-02-26 PROCEDURE — 99000 SPECIMEN HANDLING OFFICE-LAB: CPT | Performed by: FAMILY MEDICINE

## 2020-02-26 RX ORDER — ONDANSETRON 4 MG/1
4 TABLET, ORALLY DISINTEGRATING ORAL EVERY 8 HOURS PRN
COMMUNITY
Start: 2020-02-26 | End: 2023-01-04

## 2020-02-26 RX ORDER — HYDROXYZINE HYDROCHLORIDE 10 MG/1
10 TABLET, FILM COATED ORAL 3 TIMES DAILY PRN
COMMUNITY
Start: 2020-02-26 | End: 2023-01-04

## 2020-02-26 RX ORDER — CITALOPRAM HYDROBROMIDE 20 MG/1
30 TABLET ORAL DAILY
COMMUNITY
Start: 2020-02-26 | End: 2020-08-05

## 2020-02-26 RX ORDER — MIRTAZAPINE 30 MG/1
TABLET, FILM COATED ORAL
COMMUNITY
Start: 2020-02-19 | End: 2020-02-26

## 2020-02-26 RX ORDER — HYDROXYZINE HYDROCHLORIDE 50 MG/1
TABLET, FILM COATED ORAL
COMMUNITY
Start: 2020-02-19 | End: 2020-02-26

## 2020-02-26 RX ORDER — ONDANSETRON 4 MG/1
TABLET, ORALLY DISINTEGRATING ORAL
COMMUNITY
Start: 2020-01-30 | End: 2020-02-26

## 2020-02-26 RX ORDER — LAMOTRIGINE 200 MG/1
TABLET ORAL
COMMUNITY
Start: 2020-02-17 | End: 2020-02-26

## 2020-02-26 RX ORDER — HYDROXYZINE HYDROCHLORIDE 50 MG/1
100 TABLET, FILM COATED ORAL AT BEDTIME
COMMUNITY
Start: 2020-02-26 | End: 2023-01-04

## 2020-02-26 RX ORDER — CITALOPRAM HYDROBROMIDE 20 MG/1
20 TABLET ORAL DAILY
COMMUNITY
End: 2020-02-26

## 2020-02-26 RX ORDER — NALTREXONE HYDROCHLORIDE 50 MG/1
TABLET, FILM COATED ORAL
COMMUNITY
Start: 2020-02-19 | End: 2023-01-04

## 2020-02-26 RX ORDER — MIRTAZAPINE 30 MG/1
30 TABLET, FILM COATED ORAL AT BEDTIME
COMMUNITY
Start: 2020-02-26 | End: 2023-01-04

## 2020-02-26 RX ORDER — HYDROXYZINE HYDROCHLORIDE 10 MG/1
TABLET, FILM COATED ORAL
COMMUNITY
Start: 2020-02-19 | End: 2020-02-26

## 2020-02-26 RX ORDER — LAMOTRIGINE 200 MG/1
200 TABLET ORAL DAILY
COMMUNITY
Start: 2020-02-26 | End: 2023-01-04

## 2020-02-26 ASSESSMENT — PAIN SCALES - GENERAL: PAINLEVEL: NO PAIN (0)

## 2020-02-26 ASSESSMENT — MIFFLIN-ST. JEOR: SCORE: 1243.03

## 2020-02-26 NOTE — PROGRESS NOTES
Subjective     Ivy Teague is a 44 year old female who presents to clinic today for the following health issues:    HPI   New Patient/Transfer of Care  Patient with a history alcoholism and drug abuse w/, history of suicide attempt, gastric bypass,htn, hyperlipidemia, abnormal mammogram from [facility] 2/6/2020,   malabsorption syndrome, iron deficiency anemia, tobacco use disorder, generalized anxiety disorder, alcohol induced pancreatitis here to establish care with us. She leaves at [facility] but has a psychiatrist at [facility]. Her sister works here so willing to establish care here. Attends out patient rehab at [facility], has 2 sponsors and attends AA and NA regularly. Would like established with [facility] for anxiety, PTSD and depression.      Patient Active Problem List   Diagnosis     Headache     DRUG DEPEND NEC-CONTIN, (recovering and doing well)     Esophageal reflux     LGSIL on Pap smear     Skin lesion     Mirena IUD placed 9/2/10     HYPERLIPIDEMIA LDL GOAL <130     Moderate recurrent major depression (H)     Hypertension goal BP (blood pressure) < 140/90     Iron deficiency anemia     Polymorphous light eruption     Major depressive disorder, recurrent episode, mild (H)     Posttraumatic stress disorder     SHEEBA (generalized anxiety disorder)     Tobacco use disorder     Iron deficiency anemia, unspecified iron deficiency     Angular cheilitis with candidiasis     Recovering alcoholic in remission (H)     S/P gastric bypass     Bilateral leg edema     Malabsorption of iron     Suicidal behavior with attempted self-injury (H)     Encounter for insertion of mirena IUD     Past Surgical History:   Procedure Laterality Date     GASTRIC BYPASS  2003      TOOTH EXTRACTION W/FORCEP      [place] teeth removed.     NO HISTORY OF SURGERY         Social History     Tobacco Use     Smoking status: Current Every Day Smoker     Packs/day: 1.00     Types: Cigarettes     Smokeless tobacco: Never Used     Tobacco  comment: 10/15/08 1/2 PPD   Substance Use Topics     Alcohol use: Yes     Alcohol/week: 0.0 standard drinks     Comment: binge drinking     Family History   Problem Relation Age of Onset     Hypertension Mother         borderline, not on meds right now     Hypertension Father      Heart Disease Father         s/p MI and bypass surg at age 43, first MI at 38.     Gastrointestinal Disease Father         had a partial small bowel resection secondary to ischemic bowel     Neurologic Disorder Paternal Aunt         Cerebral palsey     Neurologic Disorder Maternal Uncle         epilepsy     Neurologic Disorder Other         Pat. cousin with epilepsy     Diabetes Paternal Grandmother         IDDM         Current Outpatient Medications   Medication Sig Dispense Refill     acetaminophen (TYLENOL) 500 MG tablet Take 1,000 mg by mouth every 6 hours as needed        citalopram (CELEXA) 20 MG tablet Take 1.5 tablets (30 mg) by mouth daily 1.5 daily       hydrOXYzine (ATARAX) 10 MG tablet Take 1 tablet (10 mg) by mouth 3 times daily as needed for itching       hydrOXYzine (ATARAX) 50 MG tablet Take 2 tablets (100 mg) by mouth At Bedtime       lamoTRIgine (LAMICTAL) 200 MG tablet Take 1 tablet (200 mg) by mouth daily       levonorgestrel (MIRENA) 20 MCG/24HR IUD 1 Device by Intrauterine route       lisinopril (PRINIVIL/ZESTRIL) 10 MG tablet Take 2 tablets (20 mg) by mouth daily 60 tablet 0     mirtazapine (REMERON) 30 MG tablet Take 1 tablet (30 mg) by mouth At Bedtime       naltrexone (DEPADE/REVIA) 50 MG tablet        ondansetron (ZOFRAN-ODT) 4 MG ODT tab Take 1 tablet (4 mg) by mouth every 8 hours as needed for nausea       Allergies   Allergen Reactions     No Known Drug Allergies      Trazodone Other (See Comments)     Feels hazy the next day      Recent Labs   Lab Test 02/26/20  1413 09/07/19  0804 09/05/19  1316 07/01/19  0820 06/17/19  1545  10/07/15  0957   LDL  --   --   --  83  --   --  98   HDL  --   --   --  66  --    "--  56   TRIG  --   --   --  84  --   --  74   ALT 19  --  50  --  18   < >  --    CR 0.83  --  0.49* 0.54 0.59   < >  --    GFRESTIMATED 84  --  >90 >90 >90   < >  --    GFRESTBLACK >90  --  >90 >90 >90   < >  --    POTASSIUM 4.5  --  4.4 3.7 4.0   < >  --    TSH  --  2.00  --   --  1.54   < > 1.44    < > = values in this interval not displayed.      BP Readings from Last 3 Encounters:   02/26/20 118/80   09/08/19 122/84   09/06/19 (!) 160/91    Wt Readings from Last 3 Encounters:   02/26/20 64.2 kg (141 lb 8 oz)   09/10/19 57.9 kg (127 lb 11.2 oz)   08/26/19 59.5 kg (131 lb 3.2 oz)                    Reviewed and updated as needed this visit by Provider  Problems         Review of Systems   ROS COMP: Constitutional, HEENT, cardiovascular, pulmonary, GI, , musculoskeletal, neuro, skin, endocrine and psych systems are negative, except as otherwise noted.      Objective    /80   Pulse 86   Temp 98.1  F (36.7  C) (Oral)   Ht 1.588 m (5' 2.5\")   Wt 64.2 kg (141 lb 8 oz)   SpO2 98%   BMI 25.47 kg/m    Body mass index is 25.47 kg/m .  Physical Exam   GENERAL: healthy, alert and no distress  NECK: no adenopathy, no asymmetry, masses, or scars and thyroid normal to palpation  RESP: lungs clear to auscultation - no rales, rhonchi or wheezes  CV: regular rate and rhythm, normal S1 S2, no S3 or S4, no murmur, click or rub, no peripheral edema and peripheral pulses strong  ABDOMEN: soft, nontender, no hepatosplenomegaly, no masses and bowel sounds normal  MS: no gross musculoskeletal defects noted, no edema  NEURO: Normal strength and tone, mentation intact and speech normal  PSYCH: mentation appears normal, affect normal/bright          Assessment & Plan     1. S/P gastric bypass  - Comprehensive metabolic panel  - Vitamin D Deficiency  - JUST IN CASE  - Biotinidase level  - Vitamin B12  - Vitamin B2; Future  - Vitamin B3; Future  - Vitamin B6; Future    2. Abnormal mammogram  - MA Diagnostic Digital Bilateral; " Future  - Vitamin B2; Future  - Vitamin B3; Future  - Vitamin B6; Future    3. Iron deficiency anemia secondary to inadequate dietary iron intake  - CBC with platelets and differential  - Iron and iron binding capacity    4. Benign essential hypertension  Stable, continue present management.  reviewed diet, exercise and weight control, recommended sodium restriction, very strongly urged to quit smoking to reduce cardiovascular risk, copy of written low fat low cholesterol diet provided and reviewed, cardiovascular risk and specific lipid/LDL goals reviewed.    - Lipid panel reflex to direct LDL Fasting; Future    5. Vitamin B12 deficiency (non anemic)  - Vitamin B12    6. B-complex deficiency  - Biotinidase level      7. SHEEBA (generalized anxiety disorder)  Establish with [facility]    8. Posttraumatic stress disorder  Establish with [facility]    9. Suicidal behavior with attempted self-injury (H)  Verified no suicidal thoughts or ideation at this time.    10. Alcohol dependence in remission (H)  Attends out patient rehab.           Return if symptoms worsen or fail to improve.    Bashir Duarte MD  [facility]

## 2020-02-26 NOTE — NURSING NOTE
Akbar for pt, instructed to rc for C appt. Can see Ayesha Quach or John Mcallister. Valeria Silva LPN

## 2020-02-27 LAB — DEPRECATED CALCIDIOL+CALCIFEROL SERPL-MC: 57 UG/L (ref 20–75)

## 2020-03-05 LAB — LAB SCANNED RESULT: NORMAL

## 2020-03-12 ENCOUNTER — OFFICE VISIT (OUTPATIENT)
Dept: PSYCHOLOGY | Facility: CLINIC | Age: 45
End: 2020-03-12
Payer: COMMERCIAL

## 2020-03-12 DIAGNOSIS — F10.21 RECOVERING ALCOHOLIC IN REMISSION (H): ICD-10-CM

## 2020-03-12 DIAGNOSIS — F41.1 GAD (GENERALIZED ANXIETY DISORDER): ICD-10-CM

## 2020-03-12 DIAGNOSIS — I10 HYPERTENSION GOAL BP (BLOOD PRESSURE) < 140/90: ICD-10-CM

## 2020-03-12 DIAGNOSIS — T14.91XA SUICIDAL BEHAVIOR WITH ATTEMPTED SELF-INJURY (H): ICD-10-CM

## 2020-03-12 DIAGNOSIS — F41.1 GAD (GENERALIZED ANXIETY DISORDER): Primary | ICD-10-CM

## 2020-03-12 DIAGNOSIS — F33.1 MODERATE RECURRENT MAJOR DEPRESSION (H): ICD-10-CM

## 2020-03-12 DIAGNOSIS — F43.10 PTSD (POST-TRAUMATIC STRESS DISORDER): Primary | ICD-10-CM

## 2020-03-12 DIAGNOSIS — F43.10 POSTTRAUMATIC STRESS DISORDER: ICD-10-CM

## 2020-03-12 PROCEDURE — 90791 PSYCH DIAGNOSTIC EVALUATION: CPT | Performed by: SOCIAL WORKER

## 2020-03-12 ASSESSMENT — ANXIETY QUESTIONNAIRES
3. WORRYING TOO MUCH ABOUT DIFFERENT THINGS: MORE THAN HALF THE DAYS
1. FEELING NERVOUS, ANXIOUS, OR ON EDGE: NEARLY EVERY DAY
GAD7 TOTAL SCORE: 12
IF YOU CHECKED OFF ANY PROBLEMS ON THIS QUESTIONNAIRE, HOW DIFFICULT HAVE THESE PROBLEMS MADE IT FOR YOU TO DO YOUR WORK, TAKE CARE OF THINGS AT HOME, OR GET ALONG WITH OTHER PEOPLE: SOMEWHAT DIFFICULT
7. FEELING AFRAID AS IF SOMETHING AWFUL MIGHT HAPPEN: SEVERAL DAYS
6. BECOMING EASILY ANNOYED OR IRRITABLE: NEARLY EVERY DAY
5. BEING SO RESTLESS THAT IT IS HARD TO SIT STILL: SEVERAL DAYS
2. NOT BEING ABLE TO STOP OR CONTROL WORRYING: MORE THAN HALF THE DAYS

## 2020-03-12 ASSESSMENT — PATIENT HEALTH QUESTIONNAIRE - PHQ9
5. POOR APPETITE OR OVEREATING: NOT AT ALL
SUM OF ALL RESPONSES TO PHQ QUESTIONS 1-9: 6

## 2020-03-12 NOTE — PATIENT INSTRUCTIONS
"  [facility]                                      Patient's Name: Ivy Teague  2020    SAFETY PLAN:  Step 1: Warning signs / cues (Thoughts, images, mood, situation, behavior) that a crisis may be developing:    Thoughts: \"I don't matter\", \"People would be better off without me\", \"I can't do this anymore\" and \"I just want this to end\", \"this is my fault\"    Images: visions of harm: . who would be at my ?    Thinking Processes: ruminations (can't stop thinking about my problems): . and \"freeze\"    Mood: hopelessness, helplessness, intense anger, agitation and mood swings    Behaviors: isolating/withdrawing , using drugs, using alcohol, not taking care of myself and sleeping too much    Situations: stress: kids behavioral health needs , perceptions of Mom trying to control  Step 2: Coping strategies - Things I can do to take my mind off of my problems without contacting another person (relaxation technique, physical activity):    Distress Tolerance Strategies:  crocheting, reading, writing, painting    Physical Activities: go for a walk and hike,  going to the gym, yoga, deep breathing    Focus on helpful thoughts:  \"I will get through this\" and I am worthy, I am enough, I can do this, I am a good person  Step 3: People and social settings that provide distraction:   Disha, women that I live with,  Women in treatment,  2 oldest children    park, support group (i.e. twelve-step) and camping   Step 4: Remind myself of people and things that are important to me and worth living for:    My mom, sister, kids, my extended family.   I want to get my motorcycle license, seek employment, I want to volunteer at the [facility]. I want to be active, get out there, and be a voice for recovery!   Step 5: When I am in crisis, I can ask these people to help me use my safety plan:  Sister, sponsor , Sandra, counselor at [facility]   Step 6: Making the environment safe: "     remove alcohol, remove drugs, dispose of old medications  and be around others, establish boundaries with unhealthy people  Step 7: Professionals or agencies I can contact during a crisis:    Suicide Prevention Lifeline: 7-598-551-ZIYG (3181)    Crisis Text Line Service: Text   [place]  to 203291.  Local Crisis Services: [phone].    Call 911 or go to my nearest emergency department.   I helped develop this safety plan and agree to use it when needed.  I have been given a copy of this plan.      Patient signature: _______________________________________________________________  Today s date:  March 12, 2020  Adapted from Safety Plan Template 2008 Marilyn Quesada and Reji Palma is reprinted with the express permission of the authors.  No portion of the Safety Plan Template may be reproduced without the express, written permission.  You can contact the authors at [email or web address] or [email or web address].
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
## 2020-03-12 NOTE — TELEPHONE ENCOUNTER
Hello,  last fill date:02-  Last quantity:30    ++ for lisinopril 20mg -- 1 tablet daily +++    Thank You,  Manuela Redd  Pharmacy Technician  [facility]  [phone]

## 2020-03-13 RX ORDER — LISINOPRIL 10 MG/1
20 TABLET ORAL DAILY
Qty: 60 TABLET | Refills: 0 | Status: SHIPPED | OUTPATIENT
Start: 2020-03-13 | End: 2020-08-07

## 2020-03-13 ASSESSMENT — ANXIETY QUESTIONNAIRES: GAD7 TOTAL SCORE: 12

## 2020-03-13 NOTE — TELEPHONE ENCOUNTER
Routing to provider for possible script change as patient is taking 20 mg by mouth daily    Maria E Stern, RN, BSN, PHN  [facility]

## 2020-08-03 DIAGNOSIS — I10 HYPERTENSION GOAL BP (BLOOD PRESSURE) < 140/90: ICD-10-CM

## 2020-08-07 RX ORDER — LISINOPRIL 10 MG/1
20 TABLET ORAL DAILY
Qty: 180 TABLET | Refills: 1 | Status: SHIPPED | OUTPATIENT
Start: 2020-08-07 | End: 2023-01-04

## 2020-08-07 NOTE — TELEPHONE ENCOUNTER
LISINOPRIL 10MG TABLETS       Last Written Prescription Date:  3/13/20  Last Fill Quantity: 60,   # refills: 0  Last Office Visit : 2/26/20  Future Office visit:  None scheduled    Routing refill request to provider for review/approval because:  Gap in therapy?  How taking?  No changes made to requested refill

## 2021-01-09 ENCOUNTER — HEALTH MAINTENANCE LETTER (OUTPATIENT)
Age: 46
End: 2021-01-09

## 2021-05-08 ENCOUNTER — HEALTH MAINTENANCE LETTER (OUTPATIENT)
Age: 46
End: 2021-05-08

## 2021-10-23 ENCOUNTER — HEALTH MAINTENANCE LETTER (OUTPATIENT)
Age: 46
End: 2021-10-23

## 2022-02-12 ENCOUNTER — HEALTH MAINTENANCE LETTER (OUTPATIENT)
Age: 47
End: 2022-02-12

## 2022-06-04 ENCOUNTER — HEALTH MAINTENANCE LETTER (OUTPATIENT)
Age: 47
End: 2022-06-04

## 2022-10-09 ENCOUNTER — HEALTH MAINTENANCE LETTER (OUTPATIENT)
Age: 47
End: 2022-10-09

## 2023-01-04 ENCOUNTER — HOSPITAL ENCOUNTER (EMERGENCY)
Facility: CLINIC | Age: 48
Discharge: HOME | End: 2023-01-04
Attending: FAMILY MEDICINE | Admitting: FAMILY MEDICINE
Payer: COMMERCIAL

## 2023-01-04 VITALS
DIASTOLIC BLOOD PRESSURE: 101 MMHG | BODY MASS INDEX: 24.84 KG/M2 | OXYGEN SATURATION: 97 % | WEIGHT: 138 LBS | TEMPERATURE: 98.3 F | HEART RATE: 103 BPM | RESPIRATION RATE: 20 BRPM | SYSTOLIC BLOOD PRESSURE: 185 MMHG

## 2023-01-04 DIAGNOSIS — R03.0 ELEVATED BLOOD PRESSURE READING WITHOUT DIAGNOSIS OF HYPERTENSION: ICD-10-CM

## 2023-01-04 DIAGNOSIS — N39.0 URINARY TRACT INFECTION WITHOUT HEMATURIA, SITE UNSPECIFIED: ICD-10-CM

## 2023-01-04 DIAGNOSIS — R10.9 BILATERAL FLANK PAIN: ICD-10-CM

## 2023-01-04 LAB
ALBUMIN UR-MCNC: NEGATIVE MG/DL
APPEARANCE UR: ABNORMAL
BACTERIA #/AREA URNS HPF: ABNORMAL /HPF
BILIRUB UR QL STRIP: NEGATIVE
COLOR UR AUTO: YELLOW
GLUCOSE UR STRIP-MCNC: NEGATIVE MG/DL
HGB UR QL STRIP: NEGATIVE
HYALINE CASTS: 1 /LPF
KETONES UR STRIP-MCNC: NEGATIVE MG/DL
LEUKOCYTE ESTERASE UR QL STRIP: ABNORMAL
MUCOUS THREADS #/AREA URNS LPF: PRESENT /LPF
NITRATE UR QL: POSITIVE
PH UR STRIP: 5 [PH] (ref 5–7)
RBC URINE: 1 /HPF
SP GR UR STRIP: 1.02 (ref 1–1.03)
SQUAMOUS EPITHELIAL: 3 /HPF
UROBILINOGEN UR STRIP-MCNC: NORMAL MG/DL
WBC URINE: 20 /HPF

## 2023-01-04 PROCEDURE — 87086 URINE CULTURE/COLONY COUNT: CPT | Performed by: FAMILY MEDICINE

## 2023-01-04 PROCEDURE — 81001 URINALYSIS AUTO W/SCOPE: CPT | Performed by: FAMILY MEDICINE

## 2023-01-04 PROCEDURE — 96372 THER/PROPH/DIAG INJ SC/IM: CPT | Performed by: FAMILY MEDICINE

## 2023-01-04 PROCEDURE — 250N000011 HC RX IP 250 OP 636: Performed by: FAMILY MEDICINE

## 2023-01-04 PROCEDURE — 250N000009 HC RX 250: Performed by: FAMILY MEDICINE

## 2023-01-04 PROCEDURE — 99284 EMERGENCY DEPT VISIT MOD MDM: CPT | Mod: 25 | Performed by: FAMILY MEDICINE

## 2023-01-04 PROCEDURE — 99284 EMERGENCY DEPT VISIT MOD MDM: CPT | Performed by: FAMILY MEDICINE

## 2023-01-04 RX ORDER — CEFDINIR 300 MG/1
300 CAPSULE ORAL 2 TIMES DAILY
Qty: 20 CAPSULE | Refills: 0 | Status: SHIPPED | OUTPATIENT
Start: 2023-01-04 | End: 2023-01-14

## 2023-01-04 RX ADMIN — LIDOCAINE HYDROCHLORIDE 1 G: 10 INJECTION, SOLUTION EPIDURAL; INFILTRATION; INTRACAUDAL; PERINEURAL at 19:36

## 2023-01-05 NOTE — ED TRIAGE NOTES
Patient c/o bilat flank pain and urinary frequency x 5 days     Triage Assessment     Row Name 01/04/23 1828       Triage Assessment (Adult)    Airway WDL WDL       Respiratory WDL    Respiratory WDL WDL       Skin Circulation/Temperature WDL    Skin Circulation/Temperature WDL WDL

## 2023-01-05 NOTE — DISCHARGE INSTRUCTIONS
Your urinalysis revealed nitrites and white blood cells, consistent with a urinary tract infection.  You may have a early kidney infection.  You received a dose of ceftriaxone 1 g IM in the ED.  This should help start to treat your infection.  Continue to push lots of water, and begin Omnicef 300 mg twice a day for 10 days.  Please have your blood pressure rechecked within the next week.  Monitor for further symptoms and return to the emergency department if you develop new or worsening symptoms.

## 2023-01-05 NOTE — ED PROVIDER NOTES
[facility] ED Provider Note   Patient: Ivy Teague  MRN #:  [id number]  Date of Visit: January 4, 2023    CC:     Chief Complaint   Patient presents with     Flank Pain     HPI:  Ivy Teague is a 47 year old female who presented to the emergency department with approximately 5-day history of bilateral low back and flank pain with now constant dull pain rated 8 out of 10.  Pain is not worsened or relieved by anything, and specifically not exacerbated by activity or movement.  She is also experiencing more pelvic/abdominal cramping but does not have any abnormal vaginal bleeding or discharge.  She has been prone to urinary tract infections but typically will self treat with drinking cranberry juice and taking over-the-counter Azo tablets.  She has not noticed any significant burning pain.  She denies fevers but has felt slightly chilled, and there has been no nausea, vomiting, chest pain, shortness of breath.  Bowel movements have been normal.  Patient is currently in a transitional living home in [place].  She states that she has been homeless on and off for the last couple of years.  She is now in a more stable environment.    Problem List:  Patient Active Problem List    Diagnosis Date Noted     Encounter for insertion of mirena IUD 01/29/2020     Priority: Medium     Suicidal behavior with attempted self-injury (H) 09/06/2019     Priority: Medium     Malabsorption of iron 08/08/2019     Priority: Medium     S/P gastric bypass 06/17/2019     Priority: Medium     Bilateral leg edema 06/17/2019     Priority: Medium     Angular cheilitis with candidiasis 04/13/2016     Priority: Medium     Recovering alcoholic in remission (H) 04/13/2016     Priority: Medium     Iron deficiency anemia, unspecified iron deficiency 04/08/2016     Priority: Medium     Tobacco use disorder 11/06/2015     Priority: Medium     SHEEBA (generalized anxiety  disorder) 10/07/2015     Priority: Medium     Posttraumatic stress disorder 09/23/2013     Priority: Medium     Major depressive disorder, recurrent episode, mild (H) 09/16/2013     Priority: Medium     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     Priority: Medium     Iron deficiency anemia 01/29/2013     Priority: Medium     Hypertension goal BP (blood pressure) < 140/90 11/02/2012     Priority: Medium     Moderate recurrent major depression (H) 08/11/2012     Priority: Medium     HYPERLIPIDEMIA LDL GOAL <130 10/31/2010     Priority: Medium     Mirena IUD placed 9/2/10 09/05/2010     Priority: Medium     Skin lesion 08/17/2010     Priority: Medium     suspicious for a basal cell       LGSIL on Pap smear 03/25/2008     Priority: Medium     3/15/07 pap HSIL in pregnancy  2/13/08 pap LSIL  3/24/08 colposcopy consult  5/20/08 colposcopy cancelled by patient  6/18/08 colposcopy/bx/ECC (LSIL) recommend repeat pap in 6 months  11/24/08 pap- NIL- repeat pap postpartum  10/9/09 reminder sent  8/17/10 pap ASCUS negative HPV.  Repeat screening pap in one year   9/6/11 pap ASCUS negative HPV.  Plan-- repeat screening pap in one year.  10/7/15 pap NIL/neg HPV. Plan: repeat pap and HPV test in 1 year. Due 10/7/16           Esophageal reflux      Priority: Medium     ocurred during pregnancy       DRUG DEPEND NEC-CONTIN, (recovering and doing well) 11/14/2005     Priority: Medium     Headache 03/24/2003     Priority: Medium     Problem list name updated by automated process. Provider to review         Past Medical History:   Diagnosis Date     Alcohol-induced pancreatitis 8/11/2012     Alcoholism 8/11/2012     Cyst behind right ear lobe 11/13/2013     Depressive disorder, not elsewhere classified      Esophageal reflux [id number]     Essential hypertension, benign      History of colposcopy with cervical biopsy 6/18/08     HSIL on Pap smear 3/15/07     Iron deficiency anemia 1/29/2013     Mild major depression (H) 2/21/2011     Mirena  IUD placed 9/2/10 9/5/2010     Moderate major depression (H) 8/15/2012     PAP SMEAR CERVIX W LGSIL 3/13/08     Poisoning by unspecified drug or medicinal substance(977.9) 12/23/2005     Recovering alcoholic in remission (H) 4/13/2016     Substance abuse (H)        MEDS: cefdinir (OMNICEF) 300 MG capsule        ALLERGIES:    Allergies   Allergen Reactions     Trazodone Other (See Comments)     Feels hazy the next day        Past Surgical History:   Procedure Laterality Date     GASTRIC BYPASS  2003     HC TOOTH EXTRACTION W/FORCEP      [place] teeth removed.     NO HISTORY OF SURGERY         Social History     Tobacco Use     Smoking status: Every Day     Packs/day: 1.00     Types: Cigarettes     Smokeless tobacco: Never     Tobacco comments:     10/15/08 1/2 PPD   Substance Use Topics     Alcohol use: Yes     Alcohol/week: 0.0 standard drinks     Comment: binge drinking     Drug use: No         Review of Systems   Except as noted in HPI, all other systems were reviewed and are negative    Physical Exam     Vitals were reviewed  Patient Vitals for the past 12 hrs:   BP Temp Temp src Pulse Resp SpO2 Weight   01/04/23 2000 (!) 185/101 -- -- 103 -- -- --   01/04/23 1831 (!) 184/128 98.3  F (36.8  C) Oral (!) 127 20 97 % 62.6 kg (138 lb)     GENERAL APPEARANCE: Alert and oriented x3, mild to moderate distress due to low back pain  FACE: normal facies  EYES: Pupils are equal  HENT: normal external exam; sclerae is nonicteric  RESP: normal respiratory effort; clear breath sounds bilaterally  CV: regular rate and rhythm; no significant murmurs, gallops or rubs  ABD: soft, mild bilateral CVA tenderness; mild suprapubic tenderness; no rebound or guarding; bowel sounds are normal  MS: no gross deformities noted; normal muscle tone.  SKIN: no worrisome rash  NEURO: no facial droop; no focal deficits, speech is normal        Available Lab/Imaging Results     Results for orders placed or performed during the hospital encounter of  01/04/23 (from the past 24 hour(s))   UA with Microscopic reflex to Culture    Specimen: Urine, Clean Catch   Result Value Ref Range    Color Urine Yellow Colorless, Straw, Light Yellow, Yellow    Appearance Urine Slightly Cloudy (A) Clear    Glucose Urine Negative Negative mg/dL    Bilirubin Urine Negative Negative    Ketones Urine Negative Negative mg/dL    Specific Gravity Urine 1.020 1.003 - 1.035    Blood Urine Negative Negative    pH Urine 5.0 5.0 - 7.0    Protein Albumin Urine Negative Negative mg/dL    Urobilinogen Urine Normal Normal, 2.0 mg/dL    Nitrite Urine Positive (A) Negative    Leukocyte Esterase Urine Moderate (A) Negative    Bacteria Urine Many (A) None Seen /HPF    Mucus Urine Present (A) None Seen /LPF    RBC Urine 1 <=2 /HPF    WBC Urine 20 (H) <=5 /HPF    Squamous Epithelials Urine 3 (H) <=1 /HPF    Hyaline Casts Urine 1 <=2 /LPF    Narrative    Urine Culture ordered based on laboratory criteria                Impression     Final diagnoses:   Bilateral flank pain   Urinary tract infection   Elevated blood pressure reading without diagnosis of hypertension         ED Course & Medical Decision Making   Ivy Teague is a 47 year old female who presented to the emergency department with 5-day history of low back pain, with worsening pain to the point where it is now constant and dull and rated 8 out of 10.  She is also experiencing some pelvic cramping but no overt dysuria or urinary frequency.  Vital signs reveal a temp of 98.3, blood pressure initial blood pressure was 184/128, temp of 98.3, heart rate of 127, respiratory rate of 20 with 97% oxygen saturation.  Blood pressure was rechecked at 185/101 at the time of discharge, and pulse of 103.  Patient has a history of benign hypertension but not on any blood pressure medications currently.  Exam reveals lower back pain but no definite flank tenderness.  There is some mild suprapubic tenderness.  Urinalysis reveals positive nitrites,  with many bacteria present, 1 RBC and 20 white blood cells.  Urine culture is pending.  Patient's symptoms were concerning for urinary tract infection, possible early pyelonephritis.    Patient was started on Rocephin 1 g IM in the ED.  Begin Omnicef 300 mg twice a day.  Push lots of fluids, and monitor for further symptoms.  Recheck blood pressure in the clinic within 1 week.  Return to the ED at anytime with new or worsening symptoms.        Written after-visit summary and instructions were given at the time of discharge.    Follow up Plan:   [facility] Emergency Dept  [address] [place] [id number] [phone]    If symptoms worsen    Drew Johnson MD  [address] [place] [id number] [phone]    In 1 week        Discharge Instructions:   Your urinalysis revealed nitrites and white blood cells, consistent with a urinary tract infection.  You may have a early kidney infection.  You received a dose of ceftriaxone 1 g IM in the ED.  This should help start to treat your infection.  Continue to push lots of water, and begin Omnicef 300 mg twice a day for 10 days.  Please have your blood pressure rechecked within the next week.  Monitor for further symptoms and return to the emergency department if you develop new or worsening symptoms.       Disclaimer: This note consists of words and symbols derived from keyboarding and dictation using voice recognition software.  As a result, there may be errors that have gone undetected.  Please consider this when interpreting information found in this note.       Stacey Osorio MD  01/05/23 0158

## 2023-01-07 LAB
BACTERIA UR CULT: ABNORMAL
BACTERIA UR CULT: ABNORMAL

## 2023-03-16 ENCOUNTER — HOSPITAL ENCOUNTER (EMERGENCY)
Facility: CLINIC | Age: 48
Discharge: HOME | End: 2023-03-16
Attending: PHYSICIAN ASSISTANT | Admitting: PHYSICIAN ASSISTANT
Payer: COMMERCIAL

## 2023-03-16 ENCOUNTER — HOSPITAL ENCOUNTER (EMERGENCY)
Facility: CLINIC | Age: 48
Discharge: HOME | End: 2023-03-16
Payer: COMMERCIAL

## 2023-03-16 VITALS
OXYGEN SATURATION: 98 % | TEMPERATURE: 98 F | SYSTOLIC BLOOD PRESSURE: 153 MMHG | BODY MASS INDEX: 23.4 KG/M2 | DIASTOLIC BLOOD PRESSURE: 102 MMHG | RESPIRATION RATE: 18 BRPM | HEART RATE: 102 BPM | WEIGHT: 130 LBS

## 2023-03-16 DIAGNOSIS — T74.21XA SEXUAL ASSAULT OF ADULT, INITIAL ENCOUNTER: ICD-10-CM

## 2023-03-16 PROCEDURE — 99284 EMERGENCY DEPT VISIT MOD MDM: CPT | Performed by: PHYSICIAN ASSISTANT

## 2023-03-16 PROCEDURE — 250N000013 HC RX MED GY IP 250 OP 250 PS 637: Performed by: PHYSICIAN ASSISTANT

## 2023-03-16 RX ORDER — IBUPROFEN 600 MG/1
600 TABLET, FILM COATED ORAL ONCE
Status: COMPLETED | OUTPATIENT
Start: 2023-03-16 | End: 2023-03-16

## 2023-03-16 RX ORDER — HYDROXYZINE HYDROCHLORIDE 25 MG/1
25 TABLET, FILM COATED ORAL ONCE
Status: COMPLETED | OUTPATIENT
Start: 2023-03-16 | End: 2023-03-16

## 2023-03-16 RX ORDER — EMTRICITABINE AND TENOFOVIR DISOPROXIL FUMARATE 200; 300 MG/1; MG/1
1 TABLET, FILM COATED ORAL DAILY
Qty: 27 TABLET | Refills: 0 | Status: SHIPPED | OUTPATIENT
Start: 2023-03-16 | End: 2023-04-12

## 2023-03-16 RX ORDER — ONDANSETRON 4 MG/1
4 TABLET, ORALLY DISINTEGRATING ORAL EVERY 8 HOURS PRN
Qty: 15 TABLET | Refills: 0 | Status: SHIPPED | OUTPATIENT
Start: 2023-03-16

## 2023-03-16 RX ORDER — DOLUTEGRAVIR SODIUM 50 MG/1
50 TABLET, FILM COATED ORAL DAILY
Qty: 27 TABLET | Refills: 0 | Status: SHIPPED | OUTPATIENT
Start: 2023-03-16 | End: 2023-04-12

## 2023-03-16 RX ORDER — METRONIDAZOLE 500 MG/1
500 TABLET ORAL 2 TIMES DAILY
Qty: 14 TABLET | Refills: 1 | Status: SHIPPED | OUTPATIENT
Start: 2023-03-16 | End: 2023-03-23

## 2023-03-16 RX ORDER — DOXYCYCLINE 100 MG/1
100 CAPSULE ORAL 2 TIMES DAILY
Qty: 14 CAPSULE | Refills: 0 | Status: SHIPPED | OUTPATIENT
Start: 2023-03-16 | End: 2023-03-23

## 2023-03-16 RX ORDER — ONDANSETRON 4 MG/1
4 TABLET, ORALLY DISINTEGRATING ORAL ONCE
Status: DISCONTINUED | OUTPATIENT
Start: 2023-03-16 | End: 2023-03-16 | Stop reason: HOSPADM

## 2023-03-16 RX ADMIN — IBUPROFEN 600 MG: 600 TABLET, FILM COATED ORAL at 15:58

## 2023-03-16 RX ADMIN — HYDROXYZINE HYDROCHLORIDE 25 MG: 25 TABLET ORAL at 15:58

## 2023-03-16 ASSESSMENT — ACTIVITIES OF DAILY LIVING (ADL)
ADLS_ACUITY_SCORE: 33
ADLS_ACUITY_SCORE: 35
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
## 2023-03-25 ENCOUNTER — HEALTH MAINTENANCE LETTER (OUTPATIENT)
Age: 48
End: 2023-03-25

## 2023-05-04 ENCOUNTER — APPOINTMENT (OUTPATIENT)
Dept: CT IMAGING | Facility: CLINIC | Age: 48
End: 2023-05-04
Attending: EMERGENCY MEDICINE
Payer: COMMERCIAL

## 2023-05-04 ENCOUNTER — APPOINTMENT (OUTPATIENT)
Dept: GENERAL RADIOLOGY | Facility: CLINIC | Age: 48
End: 2023-05-04
Attending: EMERGENCY MEDICINE
Payer: COMMERCIAL

## 2023-05-04 ENCOUNTER — HOSPITAL ENCOUNTER (EMERGENCY)
Facility: CLINIC | Age: 48
Discharge: HOME | End: 2023-05-04
Attending: EMERGENCY MEDICINE | Admitting: EMERGENCY MEDICINE
Payer: COMMERCIAL

## 2023-05-04 VITALS
DIASTOLIC BLOOD PRESSURE: 110 MMHG | RESPIRATION RATE: 17 BRPM | TEMPERATURE: 98 F | BODY MASS INDEX: 23.4 KG/M2 | OXYGEN SATURATION: 97 % | SYSTOLIC BLOOD PRESSURE: 179 MMHG | WEIGHT: 130 LBS | HEART RATE: 120 BPM

## 2023-05-04 DIAGNOSIS — M53.3 PAIN IN THE COCCYX: ICD-10-CM
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
## 2023-05-04 DIAGNOSIS — S06.0X0A CONCUSSION WITHOUT LOSS OF CONSCIOUSNESS, INITIAL ENCOUNTER: ICD-10-CM

## 2023-05-04 PROCEDURE — 72220 X-RAY EXAM SACRUM TAILBONE: CPT

## 2023-05-04 PROCEDURE — 99285 EMERGENCY DEPT VISIT HI MDM: CPT | Mod: 25 | Performed by: EMERGENCY MEDICINE

## 2023-05-04 PROCEDURE — 250N000011 HC RX IP 250 OP 636: Performed by: EMERGENCY MEDICINE

## 2023-05-04 PROCEDURE — 250N000013 HC RX MED GY IP 250 OP 250 PS 637: Performed by: EMERGENCY MEDICINE

## 2023-05-04 PROCEDURE — 72100 X-RAY EXAM L-S SPINE 2/3 VWS: CPT

## 2023-05-04 PROCEDURE — 70450 CT HEAD/BRAIN W/O DYE: CPT

## 2023-05-04 PROCEDURE — 99284 EMERGENCY DEPT VISIT MOD MDM: CPT | Performed by: EMERGENCY MEDICINE

## 2023-05-04 RX ORDER — HYDROCODONE BITARTRATE AND ACETAMINOPHEN 5; 325 MG/1; MG/1
1 TABLET ORAL ONCE
Status: COMPLETED | OUTPATIENT
Start: 2023-05-04 | End: 2023-05-04

## 2023-05-04 RX ORDER — ONDANSETRON 4 MG/1
4 TABLET, ORALLY DISINTEGRATING ORAL ONCE
Status: COMPLETED | OUTPATIENT
Start: 2023-05-04 | End: 2023-05-04

## 2023-05-04 RX ADMIN — HYDROCODONE BITARTRATE AND ACETAMINOPHEN 1 TABLET: 5; 325 TABLET ORAL at 16:51

## 2023-05-04 RX ADMIN — ONDANSETRON 4 MG: 4 TABLET, ORALLY DISINTEGRATING ORAL at 16:51

## 2023-05-04 ASSESSMENT — ACTIVITIES OF DAILY LIVING (ADL): ADLS_ACUITY_SCORE: 35

## 2023-05-04 NOTE — ED PROVIDER NOTES
History     Chief Complaint   Patient presents with     Fall     HPI  Ivy Teague is a 48 year old female who presents to the emergency room for concern of fall, headache, and back pain.  She was out doing yard work yesterday, and tripped over a leash in the yard.  She hit her head and also fell on her back.  She does not think she lost consciousness, but does not remember how she got up off the ground.  Has been feeling quite nauseated but has not been vomiting.  Denies blurred vision, persistent dizziness or headache, no neck pain, no ringing in her ears.  She also has pain in her tailbone where she fell.  No pain down her legs, no numbness or tingling, no saddle anesthesia, no incontinence of bowel or bladder.  She has tried taking ibuprofen and doing ice without any improvement in her pain.  Still rates it as an 8/10.  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     Priority: Medium     S/P gastric bypass 06/17/2019     Priority: Medium     Bilateral leg edema 06/17/2019     Priority: Medium     Angular cheilitis with candidiasis 04/13/2016     Priority: Medium     Recovering alcoholic in remission (H) 04/13/2016     Priority: Medium     Iron deficiency anemia, unspecified iron deficiency 04/08/2016     Priority: Medium     Tobacco use disorder 11/06/2015     Priority: Medium     SHEEBA (generalized anxiety disorder) 10/07/2015     Priority: Medium     Posttraumatic stress disorder 09/23/2013     Priority: Medium     Major depressive disorder, recurrent episode, mild (H) 09/16/2013     Priority: Medium      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     Priority: Medium     Iron deficiency anemia 01/29/2013     Priority: Medium     Hypertension goal BP (blood pressure) < 140/90 11/02/2012     Priority: Medium     Moderate recurrent major depression (H) 08/11/2012     Priority: Medium     HYPERLIPIDEMIA LDL GOAL <130 10/31/2010     Priority: Medium     Mirena IUD placed 9/2/10 09/05/2010     Priority: Medium     Skin lesion 08/17/2010     Priority: Medium     suspicious for a basal cell       LGSIL on Pap smear 03/25/2008     Priority: Medium     3/15/07 pap HSIL in pregnancy  2/13/08 pap LSIL  3/24/08 colposcopy consult  5/20/08 colposcopy cancelled by patient  6/18/08 colposcopy/bx/ECC (LSIL) recommend repeat pap in 6 months  11/24/08 pap- NIL- repeat pap postpartum  10/9/09 reminder sent  8/17/10 pap ASCUS negative HPV.  Repeat screening pap in one year   9/6/11 pap ASCUS negative HPV.  Plan-- repeat screening pap in one year.  10/7/15 pap NIL/neg HPV. Plan: repeat pap and HPV test in 1 year. Due 10/7/16           Esophageal reflux      Priority: Medium     ocurred during pregnancy       DRUG DEPEND NEC-CONTIN, (recovering and doing well) 11/14/2005     Priority: Medium     Headache 03/24/2003     Priority: Medium     Problem list name updated by automated process. Provider to review          Past Medical History:    Past Medical History:   Diagnosis Date     Alcohol-induced pancreatitis 8/11/2012     Alcoholism 8/11/2012     Cyst behind right ear lobe 11/13/2013     Depressive disorder, not elsewhere classified      Esophageal reflux [id number]     Essential hypertension, benign      History of colposcopy with cervical biopsy 6/18/08     HSIL on Pap smear 3/15/07     Iron deficiency anemia 1/29/2013     Mild major depression (H) 2/21/2011     Mirena IUD placed 9/2/10 9/5/2010     Moderate major depression (H) 8/15/2012     PAP SMEAR CERVIX W LGSIL 3/13/08     Poisoning by unspecified drug or medicinal substance(977.9)  12/23/2005     Recovering alcoholic in remission (H) 4/13/2016     Substance abuse (H)        Past Surgical History:    Past Surgical History:   Procedure Laterality Date     GASTRIC BYPASS  2003     HC TOOTH EXTRACTION W/FORCEP      [place] teeth removed.     NO HISTORY OF SURGERY         Family History:    Family History   Problem Relation Age of Onset     Hypertension Mother         borderline, not on meds right now     Hypertension Father      Heart Disease Father         s/p MI and bypass surg at age 43, first MI at 38.     Gastrointestinal Disease Father         had a partial small bowel resection secondary to ischemic bowel     Neurologic Disorder Paternal Aunt         Cerebral palsey     Neurologic Disorder Maternal Uncle         epilepsy     Neurologic Disorder Other         Pat. cousin with epilepsy     Diabetes Paternal Grandmother         IDDM       Social History:  Marital Status:  Single [1]  Social History     Tobacco Use     Smoking status: Every Day     Packs/day: 1.00     Types: Cigarettes     Smokeless tobacco: Never     Tobacco comments:     10/15/08 1/2 PPD   Substance Use Topics     Alcohol use: Yes     Drug use: No        Medications:    emtricitabine-tenofovir (TRUVADA) 200-300 MG per tablet  ondansetron (ZOFRAN ODT) 4 MG ODT tab          Review of Systems   All other systems reviewed and are negative.      Physical Exam   BP: (!) 179/110  Pulse: 120  Temp: 98  F (36.7  C)  Resp: 18  Weight: 59 kg (130 lb)  SpO2: 98 %      Physical Exam  Vitals and nursing note reviewed.   HENT:      Head: Normocephalic and atraumatic.      Right Ear: Tympanic membrane normal.      Left Ear: Tympanic membrane normal.   Eyes:      Extraocular Movements: Extraocular movements intact.      Conjunctiva/sclera: Conjunctivae normal.      Pupils: Pupils are equal, round, and reactive to light.   Pulmonary:      Effort: Pulmonary effort is normal.   Musculoskeletal:      Cervical back: Normal range of motion. No  rigidity or tenderness.      Thoracic back: Normal.      Comments: Tenderness over sacrum and coccyx with palpation   Skin:     General: Skin is warm and dry.      Findings: No bruising.   Neurological:      Mental Status: She is alert.         ED Course                 Procedures              Critical Care time:  none               Results for orders placed or performed during the hospital encounter of 05/04/23 (from the past 24 hour(s))   CT Head w/o Contrast    Narrative    EXAM: CT HEAD W/O CONTRAST  LOCATION: [facility]  DATE/TIME: 5/4/2023 5:08 PM CDT    INDICATION: Fall from standing, head trauma, headache  COMPARISON: CT head 12/23/2005  TECHNIQUE: Routine CT Head without IV contrast. Multiplanar reformats. Dose reduction techniques were used.    FINDINGS:  INTRACRANIAL CONTENTS: No intracranial hemorrhage, extraaxial collection, or mass effect.  No CT evidence of acute infarct. Normal parenchymal attenuation. Mild generalized volume loss. No hydrocephalus.     VISUALIZED ORBITS/SINUSES/MASTOIDS: No intraorbital abnormality. No paranasal sinus mucosal disease. No middle ear or mastoid effusion.    BONES/SOFT TISSUES: No acute abnormality.      Impression    IMPRESSION:  1.  No acute intracranial process.   XR Sacrum and Coccyx 2 Views    Narrative    EXAM: XR SACRUM AND COCCYX 2 VIEWS  LOCATION: [facility]  DATE/TIME: 5/4/2023 5:16 PM CDT    INDICATION: Fall from standing, pain  COMPARISON:  CT abdomen 09/05/2019.      Impression    IMPRESSION: No sacroiliac or pubic symphyseal diastases. No displaced fracture. IUD.   Lumbar spine XR, 2-3 views    Narrative    EXAM: XR LUMBAR SPINE 2/3 VIEWS  LOCATION: [facility]  DATE/TIME: 5/4/2023 5:17 PM CDT    INDICATION: Fall, back pain  COMPARISON: None.      Impression    IMPRESSION: No acute compression fracture. Normal vertebral heights. Minimal degenerative  anterolisthesis of L4 on L5. Normal disc spaces for age. Facet arthropathy at L5-S1 bilaterally. IUD.       Medications   ondansetron (ZOFRAN ODT) ODT tab 4 mg (4 mg Oral $Given 5/4/23 1611)   HYDROcodone-acetaminophen (NORCO) 5-325 MG per tablet 1 tablet (1 tablet Oral $Given 5/4/23 1651)       Assessments & Plan (with Medical Decision Making)  Ivy is a 48-year-old female presenting to the emergency room after fall yesterday after tripping over a dog leash in the yard, sustained head injury and fell on her low back and coccyx.  Is having pain despite ice and ibuprofen.  See history focused physical exam as above  48-year-old female in no acute distress, vitally stable other than elevated blood pressure at 179/110.  She does not have any evidence of palpable skull fracture, negative maria sign, negative raccoon eyes.  She does not have any neck pain, normal range of motion.  Has tenderness over the sacrum and coccyx over the midline, but no obvious step-off, no overlying ecchymosis.  We will get x-rays and will give her medication to help with pain.  She also expresses that she has been nauseated since hitting her head, suspect that she may have a mild concussion.  CT and x-ray results as above.  No acute fracture, dislocation, acute intracranial hemorrhage, or skull fracture is appreciated.  Patient states that her symptoms are improved with oral Norco and Zofran given here in the ED.  Discussed possibility of concussion as well as supportive cares for home.  Also discussed supportive management of her soft tissue injury after fall, may use over-the-counter Tylenol and ibuprofen, ice, and rest.  Should follow-up with her primary provider.  Given ED return precautions.  All questions answered and discharged in no distress     I have reviewed the nursing notes.    I have reviewed the findings, diagnosis, plan and need for follow up with the patient.           Medical Decision Making  The patient's presentation  was of low complexity (an acute and uncomplicated illness or injury).    The patient's evaluation involved:  ordering and/or review of 3+ test(s) in this encounter (see separate area of note for details)    The patient's management necessitated moderate risk (prescription drug management including medications given in the ED).        New Prescriptions    No medications on file       Final diagnoses:   Fall, initial encounter   Pain in the coccyx   Concussion without loss of consciousness, initial encounter       5/4/2023   [facility] EMERGENCY DEPT     Ghada Madrigal DO  05/04/23 [id number]

## 2023-05-04 NOTE — ED TRIAGE NOTES
Tripped over dog leash last night and c/o fall to back and posterior head pain 8/10 right now.      Triage Assessment     Row Name 05/04/23 9131       Triage Assessment (Adult)    Airway WDL WDL       Respiratory WDL    Respiratory WDL WDL       Peripheral/Neurovascular WDL    Peripheral Neurovascular WDL WDL

## 2023-05-04 NOTE — DISCHARGE INSTRUCTIONS
Your CT and x-rays did not show any sign of bleeding in the brain, no skull fracture, no broken bones or dislocation in the sacrum, coccyx, low back    You may have sustained a mild concussion.  Try to avoid eyestrain with bluelight, such as reading or looking at phone, tablets, computer, television, or reading fine print for extended periods of time    You may take Tylenol or ibuprofen per bottle instructions as needed for headache or low back pain.  Ice will also be beneficial for 15 to 20 minutes at a time several times per day to affected areas    Follow-up with your primary provider as needed.  If you develop any new or worsening symptoms, such as vision changes, worsening pain, vomiting, or any new concerns, do not hesitate to return to the emergency room for evaluation

## 2023-06-10 ENCOUNTER — HEALTH MAINTENANCE LETTER (OUTPATIENT)
Age: 48
End: 2023-06-10

## 2024-12-21 ENCOUNTER — HEALTH MAINTENANCE LETTER (OUTPATIENT)
Age: 49
End: 2024-12-21

## 2025-06-14 ENCOUNTER — HEALTH MAINTENANCE LETTER (OUTPATIENT)
Age: 50
End: 2025-06-14